# Patient Record
Sex: MALE | Race: WHITE | NOT HISPANIC OR LATINO | Employment: FULL TIME | URBAN - METROPOLITAN AREA
[De-identification: names, ages, dates, MRNs, and addresses within clinical notes are randomized per-mention and may not be internally consistent; named-entity substitution may affect disease eponyms.]

---

## 2018-09-18 ENCOUNTER — OFFICE VISIT (OUTPATIENT)
Dept: ORTHOPEDICS | Facility: CLINIC | Age: 33
End: 2018-09-18
Payer: COMMERCIAL

## 2018-09-18 VITALS — HEIGHT: 69 IN | WEIGHT: 174.16 LBS | BODY MASS INDEX: 25.8 KG/M2

## 2018-09-18 DIAGNOSIS — M54.2 CERVICALGIA: Primary | ICD-10-CM

## 2018-09-18 DIAGNOSIS — M54.9 UPPER BACK PAIN ON LEFT SIDE: ICD-10-CM

## 2018-09-18 NOTE — PROGRESS NOTES
"Sports Medicine Clinic Visit    PCP: No primary care provider on file.    Anthony Kingston is a 33 year old male who is seen  as self referral presenting with left shoulder pain/upper back pain.    Injury: None    Location of Pain: left shoulder  Duration of Pain: 2-3 week(s)  Pain is better with: Rest  Pain is worse with: Occasionally when motion (specifically with abduction and flexion)  Additional Features: Complains of loss of strength  Treatment so far consists of: Rest  Prior History of related problems:     Ht 5' 8.9\" (1.75 m)  Wt 174 lb 2.6 oz (79 kg)  BMI 25.8 kg/m2         PMH:  Condyloma acuminatum    Active problem list:  There is no problem list on file for this patient.      FH:  No family history on file.    SH:  Social History     Social History     Marital status:      Spouse name: N/A     Number of children: N/A     Years of education: N/A     Occupational History     Not on file.     Social History Main Topics     Smoking status: Light Tobacco Smoker     Smokeless tobacco: Never Used      Comment: E-Cigs on occasion     Alcohol use Not on file     Drug use: Not on file     Sexual activity: Not on file     Other Topics Concern     Not on file     Social History Narrative   physician in neuroradiology    MEDS:  See EMR, reviewed  ALL:  See EMR, reviewed    REVIEW OF SYSTEMS:  CONSTITUTIONAL:NEGATIVE for fever, chills, change in weight  INTEGUMENTARY/SKIN: NEGATIVE for worrisome rashes, moles or lesions  EYES: NEGATIVE for vision changes or irritation  ENT/MOUTH: NEGATIVE for ear, mouth and throat problems  RESP:NEGATIVE for significant cough or SOB  BREAST: NEGATIVE for masses, tenderness or discharge  CV: NEGATIVE for chest pain, palpitations or peripheral edema  GI: NEGATIVE for nausea, abdominal pain, heartburn, or change in bowel habits  :NEGATIVE for frequency, dysuria, or hematuria  :NEGATIVE for frequency, dysuria, or hematuria  NEURO: NEGATIVE for weakness, dizziness or " paresthesias  ENDOCRINE: NEGATIVE for temperature intolerance, skin/hair changes  HEME/ALLERGY/IMMUNE: NEGATIVE for bleeding problems  PSYCHIATRIC: NEGATIVE for changes in mood or affect        : 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  It seems that at the time he had clinical evidence of a winged scapula.  In the end it was related to a possible viral exposure.  Eventually it resolved.  In the midst of this workup he had an MRI of the brain, MRI of the neck and MRI of the brachial plexus that he was told were normal.  He also had an EMG but i s unsure of the actual results.   He is concerned that it could have recurred because in the last 2-3 weeks he has had a tendency to notice some discomfort in the left side of his upper back and with certain positions of lifting and reaching of his left arm he can feel discomfort in the area of the left shoulder blade or the area of the left axilla.  He denies any specific injury.  He is in the midst of a lot of studying so spends a lot of his time at a desk or computer.  He does spend some time in direct patient care.  He also has a young child at home.  This is all been stressful and he is uncertain whether some of it is related to stress.  He denies specific radicular discomfort into the upper extremity.  He says there are sometimes where he can feel a vague sensation of discomfort into the area of the deltoid upper arm forearm on the left but it does not seem to be persistent and there is no persistent numbness or tingling or burning.  He cannot think of any past injury to his neck.    Objective he tends towards a head forward shoulder forward posture.  He has normal range of motion to his cervical spine and a Spurling's maneuver is negative.  There are no impingement signs at  either shoulder and strength is intact bilaterally at the deltoid, supraspinatus, infraspinatus, subscapularis, biceps, triceps, forearm flexion, extension, digit he minimi, grasp.  Peripheral pulses are strong and symmetrical.  There is no scapular winging against a wall.  Repetitive upper extremity movements over the course of 2 minutes  in an abducted over the head position does not induce any signs of scapular winging due to muscle fatigue.  Coulter's test is negative.  Adson's maneuver is negative.  Rigoberto test is negative. Skin normal  A/ox 3.    Assessment: Neck and upper back discomfort ×2 weeks    Plan: He was reassured that I do not find signs of scapular winging on his exam.  We spent time talking about the importance of the ergonomics of the seated position and also the importance of a maintenance program that focuses on core strength and posterior shoulder strength and dynamics.  We talked about ways to achieve that over time such as yoga programs, core strengthening programs, fitness program such as [place] kettle bells.  He would like to see a physical therapist for a program that he can do at home for postural stabilization core strength and posterior shoulder dynamics and a referral was placed.

## 2018-09-18 NOTE — MR AVS SNAPSHOT
After Visit Summary   9/18/2018    Anthony Kingston    MRN: [id number]           Patient Information     Date Of Birth          1985        Visit Information        Provider Department      9/18/2018 12:30 PM Frank Arroyo MD [facility] Sports Medicine        Today's Diagnoses     Cervicalgia    -  1    Upper back pain on left side           Follow-ups after your visit        Additional Services     [facility] PT, HAND, AND CHIROPRACTIC REFERRAL       === This order will print in the [facility] Scheduling  Office ===    Physical therapy, hand therapy and chiropractic care are available through:    [facility]  [facility]    Call one easy number to schedule at any of the above locations:  [phone].    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  Young child at home.  Looking for program for home use to maximize posterior shoulder, scapular, postural cues, core strength  Therapy Orders:  Evaluate and Treat  Special Programs:  None  Special Request:  None    Additional Comments for the therapist or chiropractor:  H/o previously normal brain, neck, brachial plexus MRI in [place].  6-8 visits    Please be aware that coverage of these services is subject to the terms and limitations of your health insurance plan.  Call member services at your health plan with any benefit or coverage questions.      Please bring the following to your appointment:    >>   Your personal calendar for scheduling future appointments  >>   Comfortable clothing                  Who to contact     Please call your clinic at [phone] to:    Ask questions about your health    Make or cancel appointments    Discuss your medicines    Learn about your test  "results    Speak to your doctor            Additional Information About Your Visit        Power[employer] Information     [employer] gives you secure access to your electronic health record. If you see a primary care provider, you can also send messages to your care team and make appointments. If you have questions, please call your primary care clinic.  If you do not have a primary care provider, please call [phone] and they will assist you.      [employer] is an electronic gateway that provides easy, online access to your medical records. With [employer], you can request a clinic appointment, read your test results, renew a prescription or communicate with your care team.     To access your existing account, please contact your [facility] or call [phone] for assistance.        Care EveryWhere ID     This is your Care EveryWhere ID. This could be used by other organizations to access your [facility] medical records  [id number]        Your Vitals Were     Height BMI (Body Mass Index)                5' 8.9\" (1.75 m) 25.8 kg/m2           Blood Pressure from Last 3 Encounters:   No data found for BP    Weight from Last 3 Encounters:   09/18/18 174 lb 2.6 oz (79 kg)              We Performed the Following     [facility] PT, HAND, AND CHIROPRACTIC REFERRAL        Primary Care Provider    None Specified       No primary provider on file.        Equal Access to Services     MARCO ANTONIO FISCHER : Hadii brody jono Soeli, waaxda luqadaha, qaybta kaalmada adeegyada, darnell haro . So [facility] [phone].    ATENCIÓN: Si habla español, tiene a baez disposición servicios gratuitos de asistencia lingüística. Llame al [phone].    We comply with applicable federal civil rights laws and [place] laws. We do not discriminate on the basis of race, color, national origin, age, disability, sex, sexual orientation, or gender identity.            Thank you!     Thank you for choosing  [facility]  for your care. Our goal is always to provide you with excellent care. Hearing back from our patients is one way we can continue to improve our services. Please take a few minutes to complete the written survey that you may receive in the mail after your visit with us. Thank you!             Your Updated Medication List - Protect others around you: Learn how to safely use, store and throw away your medicines at www.disposemymeds.org.      Notice  As of 9/18/2018  1:56 PM    You have not been prescribed any medications.

## 2018-09-18 NOTE — LETTER
"  9/18/2018      RE: Anthony Kingston  [address]  [facility] [address]       Sports Medicine Clinic Visit    PCP: No primary care provider on file.    Anthony Kingston is a 33 year old male who is seen  as self referral presenting with left shoulder pain/upper back pain.    Injury: None    Location of Pain: left shoulder  Duration of Pain: 2-3 week(s)  Pain is better with: Rest  Pain is worse with: Occasionally when motion (specifically with abduction and flexion)  Additional Features: Complains of loss of strength  Treatment so far consists of: Rest  Prior History of related problems:     Ht 5' 8.9\" (1.75 m)  Wt 174 lb 2.6 oz (79 kg)  BMI 25.8 kg/m2         PMH:  Condyloma acuminatum    Active problem list:  There is no problem list on file for this patient.      FH:  No family history on file.    SH:  Social History     Social History     Marital status:      Spouse name: N/A     Number of children: N/A     Years of education: N/A     Occupational History     Not on file.     Social History Main Topics     Smoking status: Light Tobacco Smoker     Smokeless tobacco: Never Used      Comment: E-Cigs on occasion     Alcohol use Not on file     Drug use: Not on file     Sexual activity: Not on file     Other Topics Concern     Not on file     Social History Narrative   physician in neuroradiology    MEDS:  See EMR, reviewed  ALL:  See EMR, reviewed    REVIEW OF SYSTEMS:  CONSTITUTIONAL:NEGATIVE for fever, chills, change in weight  INTEGUMENTARY/SKIN: NEGATIVE for worrisome rashes, moles or lesions  EYES: NEGATIVE for vision changes or irritation  ENT/MOUTH: NEGATIVE for ear, mouth and throat problems  RESP:NEGATIVE for significant cough or SOB  BREAST: NEGATIVE for masses, tenderness or discharge  CV: NEGATIVE for chest pain, palpitations or peripheral edema  GI: NEGATIVE for nausea, abdominal pain, heartburn, or change in bowel habits  :NEGATIVE for frequency, dysuria, or hematuria  :NEGATIVE for " frequency, dysuria, or hematuria  NEURO: NEGATIVE for weakness, dizziness or paresthesias  ENDOCRINE: NEGATIVE for temperature intolerance, skin/hair changes  HEME/ALLERGY/IMMUNE: NEGATIVE for bleeding problems  PSYCHIATRIC: NEGATIVE for changes in mood or affect        : 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  It seems that at the time he had clinical evidence of a winged scapula.  In the end it was related to a possible viral exposure.  Eventually it resolved.  In the midst of this workup he had an MRI of the brain, MRI of the neck and MRI of the brachial plexus that he was told were normal.  He also had an EMG but i s unsure of the actual results.   He is concerned that it could have recurred because in the last 2-3 weeks he has had a tendency to notice some discomfort in the left side of his upper back and with certain positions of lifting and reaching of his left arm he can feel discomfort in the area of the left shoulder blade or the area of the left axilla.  He denies any specific injury.  He is in the midst of a lot of studying so spends a lot of his time at a desk or computer.  He does spend some time in direct patient care.  He also has a young child at home.  This is all been stressful and he is uncertain whether some of it is related to stress.  He denies specific radicular discomfort into the upper extremity.  He says there are sometimes where he can feel a vague sensation of discomfort into the area of the deltoid upper arm forearm on the left but it does not seem to be persistent and there is no persistent numbness or tingling or burning.  He cannot think of any past injury to his neck.    Objective he tends towards a head forward shoulder forward posture.  He has normal range of motion to his cervical spine  and a Spurling's maneuver is negative.  There are no impingement signs at either shoulder and strength is intact bilaterally at the deltoid, supraspinatus, infraspinatus, subscapularis, biceps, triceps, forearm flexion, extension, digit he minimi, grasp.  Peripheral pulses are strong and symmetrical.  There is no scapular winging against a wall.  Repetitive upper extremity movements over the course of 2 minutes  in an abducted over the head position does not induce any signs of scapular winging due to muscle fatigue.  [place]'s test is negative.  Adson's maneuver is negative.  Rigoberto test is negative. Skin normal  A/ox 3.    Assessment: Neck and upper back discomfort ×2 weeks    Plan: He was reassured that I do not find signs of scapular winging on his exam.  We spent time talking about the importance of the ergonomics of the seated position and also the importance of a maintenance program that focuses on core strength and posterior shoulder strength and dynamics.  We talked about ways to achieve that over time such as yoga programs, core strengthening programs, fitness program such as [employer] bells.  He would like to see a physical therapist for a program that he can do at home for postural stabilization core strength and posterior shoulder dynamics and a referral was placed.        Frank Arroyo MD

## 2018-10-12 ENCOUNTER — ALLIED HEALTH/NURSE VISIT (OUTPATIENT)
Dept: NURSING | Facility: CLINIC | Age: 33
End: 2018-10-12
Payer: COMMERCIAL

## 2018-10-12 DIAGNOSIS — Z23 NEED FOR PROPHYLACTIC VACCINATION AND INOCULATION AGAINST INFLUENZA: Primary | ICD-10-CM

## 2018-10-12 PROCEDURE — 99207 ZZC NO CHARGE NURSE ONLY: CPT

## 2018-10-12 PROCEDURE — 90686 IIV4 VACC NO PRSV 0.5 ML IM: CPT

## 2018-10-12 PROCEDURE — 90471 IMMUNIZATION ADMIN: CPT

## 2018-10-12 NOTE — PROGRESS NOTES

## 2018-10-12 NOTE — MR AVS SNAPSHOT
After Visit Summary   10/12/2018    Anthony Kingston    MRN: [id number]           Patient Information     Date Of Birth          1985        Visit Information        Provider Department      10/12/2018 3:45 PM CARE COORDINATOR [facility]        Today's Diagnoses     Need for prophylactic vaccination and inoculation against influenza    -  1       Follow-ups after your visit        Who to contact     If you have questions or need follow up information about today's clinic visit or your schedule please contact [facility] directly at [phone].  Normal or non-critical lab and imaging results will be communicated to you by Scan[employer]hart, letter or phone within 4 business days after the clinic has received the results. If you do not hear from us within 7 days, please contact the clinic through Boost[employer] or phone. If you have a critical or abnormal lab result, we will notify you by phone as soon as possible.  Submit refill requests through [employer] or call your pharmacy and they will forward the refill request to us. Please allow 3 business days for your refill to be completed.          Additional Information About Your Visit        MyChart Information     [employer] gives you secure access to your electronic health record. If you see a primary care provider, you can also send messages to your care team and make appointments. If you have questions, please call your primary care clinic.  If you do not have a primary care provider, please call [phone] and they will assist you.        Care EveryWhere ID     This is your Care EveryWhere ID. This could be used by other organizations to access your [facility] medical records  [id number]         Blood Pressure from Last 3 Encounters:   No data found for BP    Weight from Last 3 Encounters:   09/18/18 174 lb 2.6 oz (79 kg)              We Performed the Following     FLU VACCINE, SPLIT VIRUS, IM (QUADRIVALENT)  [78376]- >3 YRS     Vaccine Administration, Initial [69394]        Primary Care Provider    None Specified       No primary provider on file.        Equal Access to Services     MARCO ANTONIO FISCHER : Hadii brody Black, wazeinabasaf bolivar, karenkyle snidertingasaf montesporter, darnell orinin hayaan simontheresa corrales lamakhome bharat. So [facility] [phone].    ATENCIÓN: Si habla español, tiene a baez disposición servicios gratuitos de asistencia lingüística. Llame al [phone].    We comply with applicable federal civil rights laws and [place] laws. We do not discriminate on the basis of race, color, national origin, age, disability, sex, sexual orientation, or gender identity.            Thank you!     Thank you for choosing [facility]  for your care. Our goal is always to provide you with excellent care. Hearing back from our patients is one way we can continue to improve our services. Please take a few minutes to complete the written survey that you may receive in the mail after your visit with us. Thank you!             Your Updated Medication List - Protect others around you: Learn how to safely use, store and throw away your medicines at www.disposemymeds.org.      Notice  As of 10/12/2018  4:38 PM    You have not been prescribed any medications.

## 2020-03-10 ENCOUNTER — HEALTH MAINTENANCE LETTER (OUTPATIENT)
Age: 35
End: 2020-03-10

## 2020-03-18 DIAGNOSIS — R63.5 WEIGHT GAIN: Primary | ICD-10-CM

## 2020-03-19 DIAGNOSIS — R63.5 WEIGHT GAIN: ICD-10-CM

## 2020-03-19 LAB
ALBUMIN SERPL-MCNC: 4.5 G/DL (ref 3.4–5)
ALP SERPL-CCNC: 67 U/L (ref 40–150)
ALT SERPL W P-5'-P-CCNC: 21 U/L (ref 0–70)
ANION GAP SERPL CALCULATED.3IONS-SCNC: 4 MMOL/L (ref 3–14)
AST SERPL W P-5'-P-CCNC: 13 U/L (ref 0–45)
BASOPHILS # BLD AUTO: 0 10E9/L (ref 0–0.2)
BASOPHILS NFR BLD AUTO: 0.6 %
BILIRUB SERPL-MCNC: 0.6 MG/DL (ref 0.2–1.3)
BUN SERPL-MCNC: 9 MG/DL (ref 7–30)
CALCIUM SERPL-MCNC: 9.6 MG/DL (ref 8.5–10.1)
CHLORIDE SERPL-SCNC: 107 MMOL/L (ref 94–109)
CHOLEST SERPL-MCNC: 209 MG/DL
CO2 SERPL-SCNC: 30 MMOL/L (ref 20–32)
CREAT SERPL-MCNC: 0.88 MG/DL (ref 0.66–1.25)
DIFFERENTIAL METHOD BLD: NORMAL
EOSINOPHIL # BLD AUTO: 0.1 10E9/L (ref 0–0.7)
EOSINOPHIL NFR BLD AUTO: 2.5 %
ERYTHROCYTE [DISTWIDTH] IN BLOOD BY AUTOMATED COUNT: 12.3 % (ref 10–15)
GFR SERPL CREATININE-BSD FRML MDRD: >90 ML/MIN/{1.73_M2}
GLUCOSE SERPL-MCNC: 85 MG/DL (ref 70–99)
HCT VFR BLD AUTO: 48.9 % (ref 40–53)
HDLC SERPL-MCNC: 41 MG/DL
HGB BLD-MCNC: 16.5 G/DL (ref 13.3–17.7)
IMM GRANULOCYTES # BLD: 0 10E9/L (ref 0–0.4)
IMM GRANULOCYTES NFR BLD: 0.2 %
LDLC SERPL CALC-MCNC: 129 MG/DL
LYMPHOCYTES # BLD AUTO: 2.7 10E9/L (ref 0.8–5.3)
LYMPHOCYTES NFR BLD AUTO: 51.9 %
MCH RBC QN AUTO: 29.6 PG (ref 26.5–33)
MCHC RBC AUTO-ENTMCNC: 33.7 G/DL (ref 31.5–36.5)
MCV RBC AUTO: 88 FL (ref 78–100)
MONOCYTES # BLD AUTO: 0.5 10E9/L (ref 0–1.3)
MONOCYTES NFR BLD AUTO: 8.7 %
NEUTROPHILS # BLD AUTO: 1.9 10E9/L (ref 1.6–8.3)
NEUTROPHILS NFR BLD AUTO: 36.1 %
NONHDLC SERPL-MCNC: 167 MG/DL
NRBC # BLD AUTO: 0 10*3/UL
NRBC BLD AUTO-RTO: 0 /100
PLATELET # BLD AUTO: 260 10E9/L (ref 150–450)
POTASSIUM SERPL-SCNC: 4.3 MMOL/L (ref 3.4–5.3)
PROT SERPL-MCNC: 8.1 G/DL (ref 6.8–8.8)
RBC # BLD AUTO: 5.58 10E12/L (ref 4.4–5.9)
SODIUM SERPL-SCNC: 141 MMOL/L (ref 133–144)
TRIGL SERPL-MCNC: 191 MG/DL
TSH SERPL DL<=0.005 MIU/L-ACNC: 2.52 MU/L (ref 0.4–4)
WBC # BLD AUTO: 5.2 10E9/L (ref 4–11)

## 2020-09-15 ENCOUNTER — OFFICE VISIT (OUTPATIENT)
Dept: ORTHOPEDICS | Facility: CLINIC | Age: 35
End: 2020-09-15

## 2020-09-15 VITALS — WEIGHT: 189.7 LBS | RESPIRATION RATE: 16 BRPM | BODY MASS INDEX: 27.16 KG/M2 | HEIGHT: 70 IN

## 2020-09-15 DIAGNOSIS — M51.26 HERNIATED LUMBAR INTERVERTEBRAL DISC: Primary | ICD-10-CM

## 2020-09-15 RX ORDER — HYDROCODONE BITARTRATE AND ACETAMINOPHEN 5; 325 MG/1; MG/1
1 TABLET ORAL EVERY 6 HOURS PRN
COMMUNITY
End: 2022-01-04

## 2020-09-15 RX ORDER — METHYLPREDNISOLONE 4 MG
TABLET, DOSE PACK ORAL
Qty: 21 TABLET | Refills: 0 | Status: SHIPPED | OUTPATIENT
Start: 2020-09-15 | End: 2023-08-11

## 2020-09-15 ASSESSMENT — MIFFLIN-ST. JEOR: SCORE: 1804.21

## 2020-09-15 NOTE — LETTER
9/15/2020         RE: Anthony Kingston  [address]  [place] [address]        Dear Colleague,    Thank you for referring your patient, Anthony Kingston, to the [facility]. Please see a copy of my visit note below.    HISTORY OF PRESENT ILLNESS  Mr. Kingston is a pleasant 35 year old year old male who presents to clinic today with low back pain  Can explains that his low back has spasms over the past few weeks  Has pain and tingling in leg at times  Location: low back  Quality:  achy pain    Severity: 5/10 at worst    Duration: 1 month  Timing: occurs intermittently  Context: occurs while exercising and lifting  Modifying factors:  resting and non-use makes it better, movement and use makes it worse  Associated signs & symptoms: radiation of pain into legs  MEDICAL HISTORY  There is no problem list on file for this patient.      Current Outpatient Medications   Medication Sig Dispense Refill     HYDROcodone-acetaminophen (NORCO) 5-325 MG tablet Take 1 tablet by mouth every 6 hours as needed for severe pain         Allergies   Allergen Reactions     No Clinical Screening - See Comments      States he has contact dermatitis        No family history on file.  Social History     Socioeconomic History     Marital status:      Spouse name: None     Number of children: None     Years of education: None     Highest education level: None   Occupational History     None   Social Needs     Financial resource strain: None     Food insecurity     Worry: None     Inability: None     Transportation needs     Medical: None     Non-medical: None   Tobacco Use     Smoking status: Light Tobacco Smoker     Smokeless tobacco: Never Used     Tobacco comment: E-Cigs on occasion   Substance and Sexual Activity     Alcohol use: None     Drug use: None     Sexual activity: None   Lifestyle     Physical activity     Days per week: None     Minutes per session: None     Stress: None   Relationships      "Social connections     Talks on phone: None     Gets together: None     Attends Sikhism service: None     Active member of club or organization: None     Attends meetings of clubs or organizations: None     Relationship status: None     Intimate partner violence     Fear of current or ex partner: None     Emotionally abused: None     Physically abused: None     Forced sexual activity: None   Other Topics Concern     Parent/sibling w/ CABG, MI or angioplasty before 65F 55M? Not Asked   Social History Narrative     None       Additional medical/Social/Surgical histories reviewed in EPIC and updated as appropriate.     REVIEW OF SYSTEMS (9/15/2020)  10 point ROS of systems including Constitutional, Eyes, Respiratory, Cardiovascular, Gastroenterology, Genitourinary, Integumentary, Musculoskeletal, Psychiatric, Allergic/Immunologic were all negative except for pertinent positives noted in my HPI.     PHYSICAL EXAM  Vitals:    09/15/20 1402   Resp: 16   Weight: 86 kg (189 lb 11.2 oz)   Height: 1.79 m (5' 10.47\")     Vital Signs: Resp 16   Ht 1.79 m (5' 10.47\")   Wt 86 kg (189 lb 11.2 oz)   BMI 26.86 kg/m   Patient declined being weighed. Body mass index is 26.86 kg/m .    General  - normal appearance, in no obvious distress  HEENT  - conjunctivae not injected, moist mucous membranes, normocephalic/atraumatic head, ears normal appearance, no lesions, mouth normal appearance, no scars, normal dentition and teeth present  CV  - normal peripheral perfusion  Pulm  - normal respiratory pattern, non-labored  Musculoskeletal - lumbar spine  - stance: normal gait without limp, no obvious leg length discrepancy, normal heel and toe walk  - inspection: normal bone and joint alignment, no obvious scoliosis  - palpation: no paravertebral or bony tenderness  - ROM: flexion exacerbates pain, normal extension, sidebending, rotation  - strength: lower extremities 5/5 in all planes  - special tests:  (-) straight leg raise  (+) slump " test  Neuro  - patellar and Achilles DTRs 2+ bilaterally, NO lower extremity sensory deficit throughout L5 distribution, grossly normal coordination, normal muscle tone  Skin  - no ecchymosis, erythema, warmth, or induration, no obvious rash  Psych  - interactive, appropriate, normal mood and affect  ASSESSMENT & PLAN  36 yo male with lumbar disc herniation, radicular pain, stable  Reviewed lumbar imaging: shows ddd  Medrol and tizanadine  Given HEP  Consider PT  F/u in 3 weeks      Juan Bautista MD, CAQSM

## 2020-09-15 NOTE — PROGRESS NOTES
HISTORY OF PRESENT ILLNESS  Mr. Kingston is a pleasant 35 year old year old male who presents to clinic today with low back pain  Can explains that his low back has spasms over the past few weeks  Has pain and tingling in leg at times  Location: low back  Quality:  achy pain    Severity: 5/10 at worst    Duration: 1 month  Timing: occurs intermittently  Context: occurs while exercising and lifting  Modifying factors:  resting and non-use makes it better, movement and use makes it worse  Associated signs & symptoms: radiation of pain into legs  MEDICAL HISTORY  There is no problem list on file for this patient.      Current Outpatient Medications   Medication Sig Dispense Refill     HYDROcodone-acetaminophen (NORCO) 5-325 MG tablet Take 1 tablet by mouth every 6 hours as needed for severe pain         Allergies   Allergen Reactions     No Clinical Screening - See Comments      States he has contact dermatitis        No family history on file.  Social History     Socioeconomic History     Marital status:      Spouse name: None     Number of children: None     Years of education: None     Highest education level: None   Occupational History     None   Social Needs     Financial resource strain: None     Food insecurity     Worry: None     Inability: None     Transportation needs     Medical: None     Non-medical: None   Tobacco Use     Smoking status: Light Tobacco Smoker     Smokeless tobacco: Never Used     Tobacco comment: E-Cigs on occasion   Substance and Sexual Activity     Alcohol use: None     Drug use: None     Sexual activity: None   Lifestyle     Physical activity     Days per week: None     Minutes per session: None     Stress: None   Relationships     Social connections     Talks on phone: None     Gets together: None     Attends Alevism service: None     Active member of club or organization: None     Attends meetings of clubs or organizations: None     Relationship status: None     Intimate  "partner violence     Fear of current or ex partner: None     Emotionally abused: None     Physically abused: None     Forced sexual activity: None   Other Topics Concern     Parent/sibling w/ CABG, MI or angioplasty before 65F 55M? Not Asked   Social History Narrative     None       Additional medical/Social/Surgical histories reviewed in [facility] and updated as appropriate.     REVIEW OF SYSTEMS (9/15/2020)  10 point ROS of systems including Constitutional, Eyes, Respiratory, Cardiovascular, Gastroenterology, Genitourinary, Integumentary, Musculoskeletal, Psychiatric, Allergic/Immunologic were all negative except for pertinent positives noted in my HPI.     PHYSICAL EXAM  Vitals:    09/15/20 1402   Resp: 16   Weight: 86 kg (189 lb 11.2 oz)   Height: 1.79 m (5' 10.47\")     Vital Signs: Resp 16   Ht 1.79 m (5' 10.47\")   Wt 86 kg (189 lb 11.2 oz)   BMI 26.86 kg/m   Patient declined being weighed. Body mass index is 26.86 kg/m .    General  - normal appearance, in no obvious distress  HEENT  - conjunctivae not injected, moist mucous membranes, normocephalic/atraumatic head, ears normal appearance, no lesions, mouth normal appearance, no scars, normal dentition and teeth present  CV  - normal peripheral perfusion  Pulm  - normal respiratory pattern, non-labored  Musculoskeletal - lumbar spine  - stance: normal gait without limp, no obvious leg length discrepancy, normal heel and toe walk  - inspection: normal bone and joint alignment, no obvious scoliosis  - palpation: no paravertebral or bony tenderness  - ROM: flexion exacerbates pain, normal extension, sidebending, rotation  - strength: lower extremities 5/5 in all planes  - special tests:  (-) straight leg raise  (+) slump test  Neuro  - patellar and Achilles DTRs 2+ bilaterally, NO lower extremity sensory deficit throughout L5 distribution, grossly normal coordination, normal muscle tone  Skin  - no ecchymosis, erythema, warmth, or induration, no obvious " rash  Psych  - interactive, appropriate, normal mood and affect  ASSESSMENT & PLAN  34 yo male with lumbar disc herniation, radicular pain, stable  Reviewed lumbar imaging: shows ddd  Medrol and tizanadine  Given HEP  Consider PT  F/u in 3 weeks      Juan Bautista MD, CAQSM

## 2020-09-15 NOTE — NURSING NOTE
"Reason For Visit:   Chief Complaint   Patient presents with     RECHECK     MRI Results      Resp 16   Ht 1.79 m (5' 10.47\")   Wt 86 kg (189 lb 11.2 oz)   BMI 26.86 kg/m      Pain Assessment  Patient Currently in Pain: Yes  0-10 Pain Scale: 7    Johanny Monsivais ATC     "

## 2020-12-27 ENCOUNTER — HEALTH MAINTENANCE LETTER (OUTPATIENT)
Age: 35
End: 2020-12-27

## 2021-04-24 ENCOUNTER — HEALTH MAINTENANCE LETTER (OUTPATIENT)
Age: 36
End: 2021-04-24

## 2021-10-09 ENCOUNTER — HEALTH MAINTENANCE LETTER (OUTPATIENT)
Age: 36
End: 2021-10-09

## 2021-12-10 ENCOUNTER — ANCILLARY PROCEDURE (OUTPATIENT)
Dept: GENERAL RADIOLOGY | Facility: CLINIC | Age: 36
End: 2021-12-10
Attending: PREVENTIVE MEDICINE
Payer: COMMERCIAL

## 2021-12-10 ENCOUNTER — OFFICE VISIT (OUTPATIENT)
Dept: ORTHOPEDICS | Facility: CLINIC | Age: 36
End: 2021-12-10
Payer: COMMERCIAL

## 2021-12-10 VITALS — BODY MASS INDEX: 26.94 KG/M2 | HEIGHT: 69 IN | WEIGHT: 181.88 LBS

## 2021-12-10 DIAGNOSIS — M25.562 CHRONIC PAIN OF LEFT KNEE: Primary | ICD-10-CM

## 2021-12-10 DIAGNOSIS — Z00.00 HEALTH MAINTENANCE EXAMINATION: ICD-10-CM

## 2021-12-10 DIAGNOSIS — Z13.1 SCREENING FOR DIABETES MELLITUS: ICD-10-CM

## 2021-12-10 DIAGNOSIS — Z13.220 LIPID SCREENING: ICD-10-CM

## 2021-12-10 DIAGNOSIS — G89.29 CHRONIC PAIN OF LEFT KNEE: Primary | ICD-10-CM

## 2021-12-10 DIAGNOSIS — M25.562 KNEE PAIN, LEFT: ICD-10-CM

## 2021-12-10 PROCEDURE — 99213 OFFICE O/P EST LOW 20 MIN: CPT | Performed by: PREVENTIVE MEDICINE

## 2021-12-10 PROCEDURE — 73560 X-RAY EXAM OF KNEE 1 OR 2: CPT | Mod: RT | Performed by: RADIOLOGY

## 2021-12-10 PROCEDURE — 72100 X-RAY EXAM L-S SPINE 2/3 VWS: CPT | Performed by: RADIOLOGY

## 2021-12-10 RX ORDER — DICLOFENAC SODIUM 75 MG/1
75 TABLET, DELAYED RELEASE ORAL 2 TIMES DAILY
Qty: 60 TABLET | Refills: 0 | Status: SHIPPED | OUTPATIENT
Start: 2021-12-10 | End: 2023-08-11

## 2021-12-10 ASSESSMENT — MIFFLIN-ST. JEOR: SCORE: 1738.76

## 2021-12-10 NOTE — PROGRESS NOTES
HISTORY OF PRESENT ILLNESS  Mr. Kingston is a pleasant 36 year old year old male who presents to clinic today with left knee pain and radicular pain and low back pain  And request for health maintenance labs  Can explains that he doesn't have a PCP  Wants to get labs for health maintenance    Location: left knee , low back  Quality:  achy pain    Severity: 7/10 at worst    Duration: past year  Timing: occurs intermittently  Context: occurs while exercising and lifting  Modifying factors:  resting and non-use makes it better, movement and use makes it worse  Associated signs & symptoms: not swelling in knee, some low back pain that travels down leg    MEDICAL HISTORY  There is no problem list on file for this patient.      Current Outpatient Medications   Medication Sig Dispense Refill     HYDROcodone-acetaminophen (NORCO) 5-325 MG tablet Take 1 tablet by mouth every 6 hours as needed for severe pain       methylPREDNISolone (MEDROL DOSEPAK) 4 MG tablet therapy pack Follow Package Directions 21 tablet 0     methylPREDNISolone (MEDROL DOSEPAK) 4 MG tablet therapy pack Follow Package Directions 21 tablet 0     tiZANidine (ZANAFLEX) 4 MG tablet Take 1-2 tablets (4-8 mg) by mouth nightly as needed 30 tablet 1     tiZANidine (ZANAFLEX) 4 MG tablet Take 1-2 tablets (4-8 mg) by mouth nightly as needed 30 tablet 1       Allergies   Allergen Reactions     Unknown [No Clinical Screening - See Comments]      States he has contact dermatitis        No family history on file.  Social History     Socioeconomic History     Marital status:      Spouse name: Not on file     Number of children: Not on file     Years of education: Not on file     Highest education level: Not on file   Occupational History     Not on file   Tobacco Use     Smoking status: Light Tobacco Smoker     Smokeless tobacco: Never Used     Tobacco comment: E-Cigs on occasion   Substance and Sexual Activity     Alcohol use: Not on file     Drug use: Not on  file     Sexual activity: Not on file   Other Topics Concern     Parent/sibling w/ CABG, MI or angioplasty before 65F 55M? Not Asked   Social History Narrative     Not on file     Social Determinants of Health     Financial Resource Strain: Not on file   Food Insecurity: Not on file   Transportation Needs: Not on file   Physical Activity: Not on file   Stress: Not on file   Social Connections: Not on file   Intimate Partner Violence: Not on file   Housing Stability: Not on file       Additional medical/Social/Surgical histories reviewed in [facility] and updated as appropriate.     REVIEW OF SYSTEMS (12/10/2021)  10 point ROS of systems including Constitutional, Eyes, Respiratory, Cardiovascular, Gastroenterology, Genitourinary, Integumentary, Musculoskeletal, Psychiatric, Allergic/Immunologic were all negative except for pertinent positives noted in my HPI.     PHYSICAL EXAM  VSS  General  - normal appearance, in no obvious distress  HEENT  - conjunctivae not injected, moist mucous membranes, normocephalic/atraumatic head, ears normal appearance, no lesions, mouth normal appearance, no scars, normal dentition and teeth present  CV  - normal popliteal pulse  Pulm  - normal respiratory pattern, non-labored  Musculoskeletal - left knee  - stance: non antalgic gait, genu varum  - inspection: no generalized swelling, no trace effusion  - palpation: medial joint line tenderness, patella and patellar tendon non-tender, normal popliteal pulse  - ROM: 100 degrees flexion, 0 degrees extension, NON painful active ROM  - strength: 5/5 in flexion, 5/5 in extension  - neuro: no sensory or motor deficit  - special tests:  (-) Lachman  (-) anterior drawer  (-) posterior drawer  (-) pivot shift  (-) Amarilis  (-) Thessaly  (-) varus at 0 and 30 degrees flexion  (-) valgus at 0 and 30 degrees flexion  (-) Carmine s compression test  (-) patellar apprehension  Neuro  - no sensory or motor deficit, grossly normal coordination, normal muscle  tone  Skin  - no ecchymosis, erythema, warmth, or induration, no obvious rash  Psych  - interactive, appropriate, normal mood and affect  Low back: has mild pain with extension, no SLR  Cardiac: normal RRR, no murmurs, lungs: CTAB, no wheezing  ASSESSMENT & PLAN  37 yo male with left knee arthritis, lumbar radicular pain, samantha maintenance exam    I independently reviewed the following imaging studies:  xrays lumbar; Has some narrowing in lumbar spine joints  xrays knee: shows some arthritis  Consider injection for knee, consider MRI  Given HEP  RX for voltaren  Ordered labs   F/u in 1-2 months    Appropriate PPE was utilized for prevention of spread of Covid-19.  Juan Bautista MD, CAQSM

## 2021-12-10 NOTE — LETTER
12/10/2021      RE: Anthony Kingston  [address]  [place] [address]       HISTORY OF PRESENT ILLNESS  Mr. Kingston is a pleasant 36 year old year old male who presents to clinic today with left knee pain and radicular pain and low back pain  And request for health maintenance labs  Can explains that he doesn't have a PCP  Wants to get labs for health maintenance    Location: left knee , low back  Quality:  achy pain    Severity: 7/10 at worst    Duration: past year  Timing: occurs intermittently  Context: occurs while exercising and lifting  Modifying factors:  resting and non-use makes it better, movement and use makes it worse  Associated signs & symptoms: not swelling in knee, some low back pain that travels down leg    MEDICAL HISTORY  There is no problem list on file for this patient.      Current Outpatient Medications   Medication Sig Dispense Refill     HYDROcodone-acetaminophen (NORCO) 5-325 MG tablet Take 1 tablet by mouth every 6 hours as needed for severe pain       methylPREDNISolone (MEDROL DOSEPAK) 4 MG tablet therapy pack Follow Package Directions 21 tablet 0     methylPREDNISolone (MEDROL DOSEPAK) 4 MG tablet therapy pack Follow Package Directions 21 tablet 0     tiZANidine (ZANAFLEX) 4 MG tablet Take 1-2 tablets (4-8 mg) by mouth nightly as needed 30 tablet 1     tiZANidine (ZANAFLEX) 4 MG tablet Take 1-2 tablets (4-8 mg) by mouth nightly as needed 30 tablet 1       Allergies   Allergen Reactions     Unknown [No Clinical Screening - See Comments]      States he has contact dermatitis        No family history on file.  Social History     Socioeconomic History     Marital status:      Spouse name: Not on file     Number of children: Not on file     Years of education: Not on file     Highest education level: Not on file   Occupational History     Not on file   Tobacco Use     Smoking status: Light Tobacco Smoker     Smokeless tobacco: Never Used     Tobacco comment: E-Cigs on  occasion   Substance and Sexual Activity     Alcohol use: Not on file     Drug use: Not on file     Sexual activity: Not on file   Other Topics Concern     Parent/sibling w/ CABG, MI or angioplasty before 65F 55M? Not Asked   Social History Narrative     Not on file     Social Determinants of Health     Financial Resource Strain: Not on file   Food Insecurity: Not on file   Transportation Needs: Not on file   Physical Activity: Not on file   Stress: Not on file   Social Connections: Not on file   Intimate Partner Violence: Not on file   Housing Stability: Not on file       Additional medical/Social/Surgical histories reviewed in [facility] and updated as appropriate.     REVIEW OF SYSTEMS (12/10/2021)  10 point ROS of systems including Constitutional, Eyes, Respiratory, Cardiovascular, Gastroenterology, Genitourinary, Integumentary, Musculoskeletal, Psychiatric, Allergic/Immunologic were all negative except for pertinent positives noted in my HPI.     PHYSICAL EXAM  VSS  General  - normal appearance, in no obvious distress  HEENT  - conjunctivae not injected, moist mucous membranes, normocephalic/atraumatic head, ears normal appearance, no lesions, mouth normal appearance, no scars, normal dentition and teeth present  CV  - normal popliteal pulse  Pulm  - normal respiratory pattern, non-labored  Musculoskeletal - left knee  - stance: non antalgic gait, genu varum  - inspection: no generalized swelling, no trace effusion  - palpation: medial joint line tenderness, patella and patellar tendon non-tender, normal popliteal pulse  - ROM: 100 degrees flexion, 0 degrees extension, NON painful active ROM  - strength: 5/5 in flexion, 5/5 in extension  - neuro: no sensory or motor deficit  - special tests:  (-) Lachman  (-) anterior drawer  (-) posterior drawer  (-) pivot shift  (-) Amarilis  (-) Thessaly  (-) varus at 0 and 30 degrees flexion  (-) valgus at 0 and 30 degrees flexion  (-) Carmine s compression test  (-) patellar  apprehension  Neuro  - no sensory or motor deficit, grossly normal coordination, normal muscle tone  Skin  - no ecchymosis, erythema, warmth, or induration, no obvious rash  Psych  - interactive, appropriate, normal mood and affect  Low back: has mild pain with extension, no SLR  Cardiac: normal RRR, no murmurs, lungs: CTAB, no wheezing  ASSESSMENT & PLAN  37 yo male with left knee arthritis, lumbar radicular pain, samantha maintenance exam    I independently reviewed the following imaging studies:  xrays lumbar; Has some narrowing in lumbar spine joints  xrays knee: shows some arthritis  Consider injection for knee, consider MRI  Given HEP  RX for voltaren  Ordered labs   F/u in 1-2 months    Appropriate PPE was utilized for prevention of spread of Covid-19.  Juan Bautista MD, CAM

## 2021-12-10 NOTE — NURSING NOTE
"Reason For Visit:   Chief Complaint   Patient presents with     Consult     Right Knee Pain for 6-7 months aggrivated with excercise causes patient to stop his exercises. Pain with squats and flexion in posterior medial spectrum of the knee. Painful with standing as well.        Ht 1.75 m (5' 8.9\")   Wt 82.5 kg (181 lb 14.1 oz)   BMI 26.94 kg/m      Pain Assessment  Patient Currently in Pain: Yes  0-10 Pain Scale: 2 (patient says at rest it is a 1 but with exercise is a 10,reports to have been doing mountain climbers and had to stop, says it was hot and swollen after exercise)  Primary Pain Location: Knee  Pain Descriptors: Burning,Dull  Alleviating Factors: Rest,Stretching    Dimitri Iqbal, EMT    "

## 2021-12-14 ENCOUNTER — LAB (OUTPATIENT)
Dept: LAB | Facility: CLINIC | Age: 36
End: 2021-12-14
Payer: COMMERCIAL

## 2021-12-14 DIAGNOSIS — Z13.1 SCREENING FOR DIABETES MELLITUS: ICD-10-CM

## 2021-12-14 DIAGNOSIS — Z13.220 LIPID SCREENING: ICD-10-CM

## 2021-12-14 DIAGNOSIS — Z00.00 HEALTH MAINTENANCE EXAMINATION: ICD-10-CM

## 2021-12-14 LAB
ALBUMIN SERPL-MCNC: 4.1 G/DL (ref 3.4–5)
ALP SERPL-CCNC: 61 U/L (ref 40–150)
ALT SERPL W P-5'-P-CCNC: 19 U/L (ref 0–70)
ANION GAP SERPL CALCULATED.3IONS-SCNC: 8 MMOL/L (ref 3–14)
AST SERPL W P-5'-P-CCNC: 13 U/L (ref 0–45)
BASOPHILS # BLD AUTO: 0 10E3/UL (ref 0–0.2)
BASOPHILS NFR BLD AUTO: 0 %
BILIRUB SERPL-MCNC: 0.7 MG/DL (ref 0.2–1.3)
BUN SERPL-MCNC: 11 MG/DL (ref 7–30)
CALCIUM SERPL-MCNC: 9.6 MG/DL (ref 8.5–10.1)
CHLORIDE BLD-SCNC: 109 MMOL/L (ref 94–109)
CO2 SERPL-SCNC: 26 MMOL/L (ref 20–32)
CREAT SERPL-MCNC: 0.85 MG/DL (ref 0.66–1.25)
EOSINOPHIL # BLD AUTO: 0.2 10E3/UL (ref 0–0.7)
EOSINOPHIL NFR BLD AUTO: 3 %
ERYTHROCYTE [DISTWIDTH] IN BLOOD BY AUTOMATED COUNT: 12.5 % (ref 10–15)
GFR SERPL CREATININE-BSD FRML MDRD: >90 ML/MIN/1.73M2
GLUCOSE BLD-MCNC: 97 MG/DL (ref 70–99)
HBA1C MFR BLD: 4.8 % (ref 0–5.6)
HCT VFR BLD AUTO: 48.6 % (ref 40–53)
HGB BLD-MCNC: 16.1 G/DL (ref 13.3–17.7)
IMM GRANULOCYTES # BLD: 0 10E3/UL
IMM GRANULOCYTES NFR BLD: 0 %
LYMPHOCYTES # BLD AUTO: 1.8 10E3/UL (ref 0.8–5.3)
LYMPHOCYTES NFR BLD AUTO: 24 %
MCH RBC QN AUTO: 29.4 PG (ref 26.5–33)
MCHC RBC AUTO-ENTMCNC: 33.1 G/DL (ref 31.5–36.5)
MCV RBC AUTO: 89 FL (ref 78–100)
MONOCYTES # BLD AUTO: 0.5 10E3/UL (ref 0–1.3)
MONOCYTES NFR BLD AUTO: 7 %
NEUTROPHILS # BLD AUTO: 4.7 10E3/UL (ref 1.6–8.3)
NEUTROPHILS NFR BLD AUTO: 66 %
NRBC # BLD AUTO: 0 10E3/UL
NRBC BLD AUTO-RTO: 0 /100
PLATELET # BLD AUTO: 244 10E3/UL (ref 150–450)
POTASSIUM BLD-SCNC: 4.1 MMOL/L (ref 3.4–5.3)
PROT SERPL-MCNC: 7.8 G/DL (ref 6.8–8.8)
RBC # BLD AUTO: 5.48 10E6/UL (ref 4.4–5.9)
SODIUM SERPL-SCNC: 143 MMOL/L (ref 133–144)
WBC # BLD AUTO: 7.3 10E3/UL (ref 4–11)

## 2021-12-14 PROCEDURE — 83036 HEMOGLOBIN GLYCOSYLATED A1C: CPT | Performed by: PATHOLOGY

## 2021-12-14 PROCEDURE — 80061 LIPID PANEL: CPT | Mod: 90 | Performed by: PATHOLOGY

## 2021-12-14 PROCEDURE — 99000 SPECIMEN HANDLING OFFICE-LAB: CPT | Performed by: PATHOLOGY

## 2021-12-14 PROCEDURE — 36415 COLL VENOUS BLD VENIPUNCTURE: CPT | Performed by: PATHOLOGY

## 2021-12-14 PROCEDURE — 80053 COMPREHEN METABOLIC PANEL: CPT | Performed by: PATHOLOGY

## 2021-12-14 PROCEDURE — 85025 COMPLETE CBC W/AUTO DIFF WBC: CPT | Performed by: PATHOLOGY

## 2021-12-15 LAB
CHOLEST SERPL-MCNC: 202 MG/DL
HDLC SERPL-MCNC: 38 MG/DL
LDLC SERPL CALC-MCNC: 122 MG/DL
NONHDLC SERPL-MCNC: 164 MG/DL
TRIGL SERPL-MCNC: 212 MG/DL

## 2022-01-04 ENCOUNTER — OFFICE VISIT (OUTPATIENT)
Dept: ORTHOPEDICS | Facility: CLINIC | Age: 37
End: 2022-01-04
Payer: COMMERCIAL

## 2022-01-04 VITALS
RESPIRATION RATE: 17 BRPM | DIASTOLIC BLOOD PRESSURE: 75 MMHG | HEART RATE: 66 BPM | SYSTOLIC BLOOD PRESSURE: 111 MMHG | HEIGHT: 69 IN | WEIGHT: 181 LBS | BODY MASS INDEX: 26.81 KG/M2

## 2022-01-04 DIAGNOSIS — M25.561 RIGHT KNEE PAIN, UNSPECIFIED CHRONICITY: Primary | ICD-10-CM

## 2022-01-04 PROCEDURE — 99213 OFFICE O/P EST LOW 20 MIN: CPT | Performed by: PREVENTIVE MEDICINE

## 2022-01-04 ASSESSMENT — MIFFLIN-ST. JEOR: SCORE: 1734.77

## 2022-01-04 NOTE — LETTER
1/4/2022      RE: Anthony Kingston  [address]  [place] [address]       HISTORY OF PRESENT ILLNESS  Mr. Kingston is a pleasant 36 year old year old male who presents to clinic today with lumbar pain and knee pain  Doing better  No signficant pain today  Doing voltaren PRN  MEDICAL HISTORY  There is no problem list on file for this patient.      Current Outpatient Medications   Medication Sig Dispense Refill     diclofenac (VOLTAREN) 75 MG EC tablet Take 1 tablet (75 mg) by mouth 2 times daily 60 tablet 0     methylPREDNISolone (MEDROL DOSEPAK) 4 MG tablet therapy pack Follow Package Directions (Patient not taking: Reported on 12/10/2021) 21 tablet 0     methylPREDNISolone (MEDROL DOSEPAK) 4 MG tablet therapy pack Follow Package Directions (Patient not taking: Reported on 12/10/2021) 21 tablet 0     tiZANidine (ZANAFLEX) 4 MG tablet Take 1-2 tablets (4-8 mg) by mouth nightly as needed (Patient not taking: Reported on 12/10/2021) 30 tablet 1     tiZANidine (ZANAFLEX) 4 MG tablet Take 1-2 tablets (4-8 mg) by mouth nightly as needed (Patient not taking: Reported on 12/10/2021) 30 tablet 1       Allergies   Allergen Reactions     Unknown [No Clinical Screening - See Comments]      States he has contact dermatitis        No family history on file.  Social History     Socioeconomic History     Marital status:      Spouse name: None     Number of children: None     Years of education: None     Highest education level: None   Occupational History     None   Tobacco Use     Smoking status: Light Tobacco Smoker     Smokeless tobacco: Never Used     Tobacco comment: E-Cigs on occasion   Substance and Sexual Activity     Alcohol use: None     Drug use: None     Sexual activity: None   Other Topics Concern     Parent/sibling w/ CABG, MI or angioplasty before 65F 55M? Not Asked   Social History Narrative     None     Social Determinants of Health     Financial Resource Strain: Not on file   Food  "Insecurity: Not on file   Transportation Needs: Not on file   Physical Activity: Not on file   Stress: Not on file   Social Connections: Not on file   Intimate Partner Violence: Not on file   Housing Stability: Not on file       Additional medical/Social/Surgical histories reviewed in [facility] and updated as appropriate.     REVIEW OF SYSTEMS (1/4/2022)  10 point ROS of systems including Constitutional, Eyes, Respiratory, Cardiovascular, Gastroenterology, Genitourinary, Integumentary, Musculoskeletal, Psychiatric, Allergic/Immunologic were all negative except for pertinent positives noted in my HPI.     PHYSICAL EXAM  Vitals:    01/04/22 1613   Resp: 17   Weight: 82.1 kg (181 lb)   Height: 1.75 m (5' 8.9\")     Vital Signs: Resp 17   Ht 1.75 m (5' 8.9\")   Wt 82.1 kg (181 lb)   BMI 26.81 kg/m   Patient declined being weighed. Body mass index is 26.81 kg/m .    General  - normal appearance, in no obvious distress  HEENT  - conjunctivae not injected, moist mucous membranes, normocephalic/atraumatic head, ears normal appearance, no lesions, mouth normal appearance, no scars, normal dentition and teeth present  CV  - normal popliteal pulse  Pulm  - normal respiratory pattern, non-labored  Musculoskeletal - right knee  - stance: normal gait without limp, no obvious leg length discrepancy, single-leg squat exhibits knee valgus, internal rotation of the hip, contralateral hip drop  - inspection: no swelling or effusion, normal muscle tone, normal bone and joint alignment, no obvious deformity  - palpation: no joint line tenderness, patellar tendon non-tender, tender medial patellar facet  - ROM: 135 degrees flexion, -5 degrees extension, not painful, crepitus with weight-bearing flexion  - strength: 5/5 in flexion, 5/5 in extension  - neuro: no sensory or motor deficit  - special tests:  (-) Lachman  (-) anterior drawer  (-) Amarilis  (-) Thessaly  (-) varus at 0 and 30 degrees flexion  (-) valgus at 0 and 30 degrees " flexion  (+) Carmine s compression test  (+) patellar grind  (-) patellar apprehension  Neuro  - no sensory or motor deficit, grossly normal coordination, normal muscle tone  Skin  - no ecchymosis, erythema, warmth, or induration, no obvious rash  Psych  - interactive, appropriate, normal mood and affect  ASSESSMENT & PLAN  37 yo male with knee pain due to patellofemoral pain, lumbar ddd, radicular pain    I independently reviewed the following imaging studies:  Lumbar xrays: shows no signficiant ddd  xrays knee: shows some PF narrowing  Can consider PT and further treatments  Cont. voltaren       Appropriate PPE was utilized for prevention of spread of Covid-19.  Juan Bautista MD, CAM        Juan Bautista MD

## 2022-01-04 NOTE — NURSING NOTE
"Reason For Visit:   Chief Complaint   Patient presents with     RECHECK     f'u on Rx and PT. pain in Right Knee. pt stated it's helping, pain is still there but it's better.        Resp 17   Ht 1.75 m (5' 8.9\")   Wt 82.1 kg (181 lb)   BMI 26.81 kg/m      Pain Assessment  Patient Currently in Pain: Yes  0-10 Pain Scale: 1  Primary Pain Location: Knee    Johanny Chapman ATC       "

## 2022-01-04 NOTE — PROGRESS NOTES
HISTORY OF PRESENT ILLNESS  Mr. Kingston is a pleasant 36 year old year old male who presents to clinic today with lumbar pain and knee pain  Doing better  No signficant pain today  Doing voltaren PRN  MEDICAL HISTORY  There is no problem list on file for this patient.      Current Outpatient Medications   Medication Sig Dispense Refill     diclofenac (VOLTAREN) 75 MG EC tablet Take 1 tablet (75 mg) by mouth 2 times daily 60 tablet 0     methylPREDNISolone (MEDROL DOSEPAK) 4 MG tablet therapy pack Follow Package Directions (Patient not taking: Reported on 12/10/2021) 21 tablet 0     methylPREDNISolone (MEDROL DOSEPAK) 4 MG tablet therapy pack Follow Package Directions (Patient not taking: Reported on 12/10/2021) 21 tablet 0     tiZANidine (ZANAFLEX) 4 MG tablet Take 1-2 tablets (4-8 mg) by mouth nightly as needed (Patient not taking: Reported on 12/10/2021) 30 tablet 1     tiZANidine (ZANAFLEX) 4 MG tablet Take 1-2 tablets (4-8 mg) by mouth nightly as needed (Patient not taking: Reported on 12/10/2021) 30 tablet 1       Allergies   Allergen Reactions     Unknown [No Clinical Screening - See Comments]      States he has contact dermatitis        No family history on file.  Social History     Socioeconomic History     Marital status:      Spouse name: None     Number of children: None     Years of education: None     Highest education level: None   Occupational History     None   Tobacco Use     Smoking status: Light Tobacco Smoker     Smokeless tobacco: Never Used     Tobacco comment: E-Cigs on occasion   Substance and Sexual Activity     Alcohol use: None     Drug use: None     Sexual activity: None   Other Topics Concern     Parent/sibling w/ CABG, MI or angioplasty before 65F 55M? Not Asked   Social History Narrative     None     Social Determinants of Health     Financial Resource Strain: Not on file   Food Insecurity: Not on file   Transportation Needs: Not on file   Physical Activity: Not on file  "  Stress: Not on file   Social Connections: Not on file   Intimate Partner Violence: Not on file   Housing Stability: Not on file       Additional medical/Social/Surgical histories reviewed in [facility] and updated as appropriate.     REVIEW OF SYSTEMS (1/4/2022)  10 point ROS of systems including Constitutional, Eyes, Respiratory, Cardiovascular, Gastroenterology, Genitourinary, Integumentary, Musculoskeletal, Psychiatric, Allergic/Immunologic were all negative except for pertinent positives noted in my HPI.     PHYSICAL EXAM  Vitals:    01/04/22 1613   Resp: 17   Weight: 82.1 kg (181 lb)   Height: 1.75 m (5' 8.9\")     Vital Signs: Resp 17   Ht 1.75 m (5' 8.9\")   Wt 82.1 kg (181 lb)   BMI 26.81 kg/m   Patient declined being weighed. Body mass index is 26.81 kg/m .    General  - normal appearance, in no obvious distress  HEENT  - conjunctivae not injected, moist mucous membranes, normocephalic/atraumatic head, ears normal appearance, no lesions, mouth normal appearance, no scars, normal dentition and teeth present  CV  - normal popliteal pulse  Pulm  - normal respiratory pattern, non-labored  Musculoskeletal - right knee  - stance: normal gait without limp, no obvious leg length discrepancy, single-leg squat exhibits knee valgus, internal rotation of the hip, contralateral hip drop  - inspection: no swelling or effusion, normal muscle tone, normal bone and joint alignment, no obvious deformity  - palpation: no joint line tenderness, patellar tendon non-tender, tender medial patellar facet  - ROM: 135 degrees flexion, -5 degrees extension, not painful, crepitus with weight-bearing flexion  - strength: 5/5 in flexion, 5/5 in extension  - neuro: no sensory or motor deficit  - special tests:  (-) Lachman  (-) anterior drawer  (-) Amarilis  (-) Thessaly  (-) varus at 0 and 30 degrees flexion  (-) valgus at 0 and 30 degrees flexion  (+) Carmine s compression test  (+) patellar grind  (-) patellar apprehension  Neuro  - no " sensory or motor deficit, grossly normal coordination, normal muscle tone  Skin  - no ecchymosis, erythema, warmth, or induration, no obvious rash  Psych  - interactive, appropriate, normal mood and affect  ASSESSMENT & PLAN  37 yo male with knee pain due to patellofemoral pain, lumbar ddd, radicular pain    I independently reviewed the following imaging studies:  Lumbar xrays: shows no signficiant ddd  xrays knee: shows some PF narrowing  Can consider PT and further treatments  Cont. voltaren       Appropriate PPE was utilized for prevention of spread of Covid-19.  Juan Bautista MD, CAQSM

## 2022-01-04 NOTE — LETTER
Date:January 19, 2022      Patient was self referred, no letter generated. Do not send.        [facility] Health Information

## 2022-02-04 ENCOUNTER — OFFICE VISIT (OUTPATIENT)
Dept: ORTHOPEDICS | Facility: CLINIC | Age: 37
End: 2022-02-04
Payer: COMMERCIAL

## 2022-02-04 VITALS — BODY MASS INDEX: 26.66 KG/M2 | RESPIRATION RATE: 17 BRPM | WEIGHT: 180 LBS | HEIGHT: 69 IN

## 2022-02-04 DIAGNOSIS — M23.300 DEGENERATIVE TEAR OF LATERAL MENISCUS, RIGHT: ICD-10-CM

## 2022-02-04 DIAGNOSIS — S89.91XS RIGHT KNEE INJURY, SEQUELA: Primary | ICD-10-CM

## 2022-02-04 DIAGNOSIS — M71.20 SYNOVIAL CYST OF POPLITEAL SPACE, UNSPECIFIED LATERALITY: ICD-10-CM

## 2022-02-04 DIAGNOSIS — M25.461 EFFUSION, RIGHT KNEE: ICD-10-CM

## 2022-02-04 DIAGNOSIS — M25.562 KNEE PAIN, LEFT: Primary | ICD-10-CM

## 2022-02-04 PROCEDURE — 20610 DRAIN/INJ JOINT/BURSA W/O US: CPT | Mod: RT | Performed by: PREVENTIVE MEDICINE

## 2022-02-04 PROCEDURE — 99214 OFFICE O/P EST MOD 30 MIN: CPT | Mod: 25 | Performed by: PREVENTIVE MEDICINE

## 2022-02-04 RX ADMIN — TRIAMCINOLONE ACETONIDE 40 MG: 40 INJECTION, SUSPENSION INTRA-ARTICULAR; INTRAMUSCULAR at 13:47

## 2022-02-04 RX ADMIN — LIDOCAINE HYDROCHLORIDE 4 ML: 10 INJECTION, SOLUTION EPIDURAL; INFILTRATION; INTRACAUDAL; PERINEURAL at 13:47

## 2022-02-04 ASSESSMENT — MIFFLIN-ST. JEOR: SCORE: 1730.23

## 2022-02-04 NOTE — LETTER
Date:February 14, 2022      Patient was self referred, no letter generated. Do not send.        [facility] Health Information

## 2022-02-04 NOTE — LETTER
2/4/2022      RE: Anthony Kingston  [address]  [place] [address]       HISTORY OF PRESENT ILLNESS  Mr. Kingston is a pleasant 36 year old year old male who presents to clinic today with   Can explains that   Location:   Quality:  achy pain        Duration:   Timing: occurs intermittently  Context: occurs while exercising and lifting  Modifying factors:  resting and non-use makes it better, movement and use makes it worse  Associated signs & symptoms: none  Previous similar pain: yes  Exercise: lifting weights and cardiovascular on machine  Additional history: as documented    MEDICAL HISTORY  There is no problem list on file for this patient.      Current Outpatient Medications   Medication Sig Dispense Refill     diclofenac (VOLTAREN) 75 MG EC tablet Take 1 tablet (75 mg) by mouth 2 times daily 60 tablet 0     methylPREDNISolone (MEDROL DOSEPAK) 4 MG tablet therapy pack Follow Package Directions 21 tablet 0     methylPREDNISolone (MEDROL DOSEPAK) 4 MG tablet therapy pack Follow Package Directions 21 tablet 0     tiZANidine (ZANAFLEX) 4 MG tablet Take 1-2 tablets (4-8 mg) by mouth nightly as needed 30 tablet 1     tiZANidine (ZANAFLEX) 4 MG tablet Take 1-2 tablets (4-8 mg) by mouth nightly as needed 30 tablet 1       Allergies   Allergen Reactions     Unknown [No Clinical Screening - See Comments]      States he has contact dermatitis        No family history on file.  Social History     Socioeconomic History     Marital status:      Spouse name: None     Number of children: None     Years of education: None     Highest education level: None   Occupational History     None   Tobacco Use     Smoking status: Light Tobacco Smoker     Smokeless tobacco: Never Used     Tobacco comment: E-Cigs on occasion   Substance and Sexual Activity     Alcohol use: None     Drug use: None     Sexual activity: None   Other Topics Concern     Parent/sibling w/ CABG, MI or angioplasty before 65F 55M? Not Asked  "  Social History Narrative     None     Social Determinants of Health     Financial Resource Strain: Not on file   Food Insecurity: Not on file   Transportation Needs: Not on file   Physical Activity: Not on file   Stress: Not on file   Social Connections: Not on file   Intimate Partner Violence: Not on file   Housing Stability: Not on file       Additional medical/Social/Surgical histories reviewed in [facility] and updated as appropriate.     REVIEW OF SYSTEMS (2/4/2022)  10 point ROS of systems including Constitutional, Eyes, Respiratory, Cardiovascular, Gastroenterology, Genitourinary, Integumentary, Musculoskeletal, Psychiatric, Allergic/Immunologic were all negative except for pertinent positives noted in my HPI.     PHYSICAL EXAM  Vitals:    02/04/22 1236   Resp: 17   Weight: 81.6 kg (180 lb)   Height: 1.75 m (5' 8.9\")     Vital Signs: Resp 17   Ht 1.75 m (5' 8.9\")   Wt 81.6 kg (180 lb)   BMI 26.66 kg/m   Patient declined being weighed. Body mass index is 26.66 kg/m .    General  - normal appearance, in no obvious distress  HEENT  - conjunctivae not injected, moist mucous membranes, normocephalic/atraumatic head, ears normal appearance, no lesions, mouth normal appearance, no scars, normal dentition and teeth present  CV  - normal popliteal pulse  Pulm  - normal respiratory pattern, non-labored  Musculoskeletal - knee  - stance: normal gait without limp, no obvious leg length discrepancy  - inspection: trace effusion, no ecchymosis, normal muscle tone, normal bone and joint alignment, no obvious deformity  - palpation: medial joint line tenderness, patella and patellar tendon non-tender, normal popliteal pulse  - ROM: 135 degrees flexion, -5 degrees extension, pain at terminal extension passively  - strength: 5/5 in flexion, 5/5 in extension  - neuro: no sensory or motor deficit  - special tests:  (-) Lachman  (-) anterior drawer  (-) posterior drawer  (-) pivot shift  (+) Amarilis  (+) Thessaly  (-) varus at 0 " and 30 degrees flexion  (-) valgus at 0 and 30 degrees flexion  (-) Carmine s compression test  (-) patellar apprehension  Neuro  - no sensory or motor deficit, grossly normal coordination, normal muscle tone  Skin  - no ecchymosis, erythema, warmth, or induration, no obvious rash  Psych  - interactive, appropriate, normal mood and affect    ASSESSMENT & PLAN  35 yo male with right knee pain and swelling from injury     I independently reviewed the following imaging studies:    Appropriate PPE was utilized for prevention of spread of Covid-19.  Juan Bautista MD, CAQSM      Large Joint Injection/Arthocentesis: R knee joint    Date/Time: 2022 1:47 PM  Performed by: Juan Bautista MD  Authorized by: Juan Bautista MD     Indications:  Pain and osteoarthritis  Needle Size:  22 G  Guidance: landmark guided    Approach:  Superolateral  Location:  Knee      Medications:  40 mg triamcinolone 40 MG/ML; 4 mL lidocaine (PF) 1 %  Outcome:  Tolerated well, no immediate complications  Procedure discussed: discussed risks, benefits, and alternatives    Consent Given by:  Patient  Timeout: timeout called immediately prior to procedure    Prep: patient was prepped and draped in usual sterile fashion          [facility] SP[facility] MEDICINE [address] [address] [phone]  Dept: [phone]  ______________________________________________________________________________    Patient: Anthony Kingston   : 1985   MRN: [id number]   2022    INVASIVE PROCEDURE SAFETY CHECKLIST    Date: 2022   Procedure: Right knee steroid injection   Patient Name: Anthony Kingston  MRN: [id number]  YOB: 1985    Action: Complete sections as appropriate. Any discrepancy results in a HARD COPY until resolved.     PRE PROCEDURE:  Patient ID verified with 2 identifiers (name and  or MRN): Yes  Procedure and site verified with patient/designee (when  able): Yes  Accurate consent documentation in medical record: Yes  H&P (or appropriate assessment) documented in medical record: Yes  H&P must be up to 20 days prior to procedure and updates within 24 hours of procedure as applicable: Yes  Relevant diagnostic and radiology test results appropriately labeled and displayed as applicable: Yes  Procedure site(s) marked with provider initials: Yes    TIMEOUT:  Time-Out performed immediately prior to starting procedure, including verbal and active participation of all team members addressing the following:Yes  * Correct patient identify  * Confirmed that the correct side and site are marked  * An accurate procedure consent form  * Agreement on the procedure to be done  * Correct patient position  * Relevant images and results are properly labeled and appropriately displayed  * The need to administer antibiotics or fluids for irrigation purposes during the procedure as applicable   * Safety precautions based on patient history or medication use    DURING PROCEDURE: Verification of correct person, site, and procedures any time the responsibility for care of the patient is transferred to another member of the care team.       The following medications were given:         Prior to injection, verified patient identity using patient's name and date of birth.  Due to injection administration, patient instructed to remain in clinic for 15 minutes  afterwards, and to report any adverse reaction to me immediately.    Joint injection was performed.    Medication Name: Lidocaine 1%   NDC 98750-581-28  Drug Amount Wasted:  Yes: 12 mg/ml   Vial/Syringe: Multi dose vial  Expiration Date:  07/2025    Medication Name: Kenalog 40mg/mL   NDC [address]-3  Drug Amount Wasted:  None.  Vial/Syringe: Single dose vial  Expiration Date:  10/2023    Scribed by Johanny Chapman ATC for Dr. Bautista on February 4, 2022 at 1:15pm based on the provider's statements to me.     Johanny Chapman ATC              Juan Bautista MD

## 2022-02-04 NOTE — PROGRESS NOTES
Large Joint Injection/Arthocentesis: R knee joint    Date/Time: 2022 1:47 PM  Performed by: Juan Bautista MD  Authorized by: Juan Bautista MD     Indications:  Pain, osteoarthritis, diagnostic evaluation and joint swelling  Needle Size:  22 G  Guidance: landmark guided    Approach:  Posterior  Location:  Knee      Medications:  40 mg triamcinolone 40 MG/ML; 4 mL lidocaine (PF) 1 %  Outcome:  Tolerated well, no immediate complications  Procedure discussed: discussed risks, benefits, and alternatives    Consent Given by:  Patient  Timeout: timeout called immediately prior to procedure    Prep: patient was prepped and draped in usual sterile fashion          [facility] SP[facility]ICINE [address]  [address] [facility] [id number] [phone]  Dept: [phone]  ______________________________________________________________________________    Patient: Anthony Kingston   : 1985   MRN: [id number]   2022    INVASIVE PROCEDURE SAFETY CHECKLIST    Date: 2022   Procedure: Right knee steroid injection   Patient Name: Anthony Kingston  MRN: [id number]  YOB: 1985    Action: Complete sections as appropriate. Any discrepancy results in a HARD COPY until resolved.     PRE PROCEDURE:  Patient ID verified with 2 identifiers (name and  or MRN): Yes  Procedure and site verified with patient/designee (when able): Yes  Accurate consent documentation in medical record: Yes  H&P (or appropriate assessment) documented in medical record: Yes  H&P must be up to 20 days prior to procedure and updates within 24 hours of procedure as applicable: Yes  Relevant diagnostic and radiology test results appropriately labeled and displayed as applicable: Yes  Procedure site(s) marked with provider initials: Yes    TIMEOUT:  Time-Out performed immediately prior to starting procedure, including verbal and active participation of all team members addressing  the following:Yes  * Correct patient identify  * Confirmed that the correct side and site are marked  * An accurate procedure consent form  * Agreement on the procedure to be done  * Correct patient position  * Relevant images and results are properly labeled and appropriately displayed  * The need to administer antibiotics or fluids for irrigation purposes during the procedure as applicable   * Safety precautions based on patient history or medication use    DURING PROCEDURE: Verification of correct person, site, and procedures any time the responsibility for care of the patient is transferred to another member of the care team.       The following medications were given:         Prior to injection, verified patient identity using patient's name and date of birth.  Due to injection administration, patient instructed to remain in clinic for 15 minutes  afterwards, and to report any adverse reaction to me immediately.    Joint injection was performed.    Medication Name: Lidocaine 1%   NDC 23704-543-91  Drug Amount Wasted:  Yes: 12 mg/ml   Vial/Syringe: Multi dose vial  Expiration Date:  07/2025    Medication Name: Kenalog 40mg/mL   NDC [address]-7  Drug Amount Wasted:  None.  Vial/Syringe: Single dose vial  Expiration Date:  10/2023    Scribed by Johanny Chapman ATC for Dr. Bautista on February 4, 2022 at 1:15pm based on the provider's statements to me.     Johanny Chapman ATC

## 2022-02-04 NOTE — NURSING NOTE
"Reason For Visit:   Chief Complaint   Patient presents with     RECHECK     right knee pain       Resp 17   Ht 1.75 m (5' 8.9\")   Wt 81.6 kg (180 lb)   BMI 26.66 kg/m      Pain Assessment  Patient Currently in Pain: Yes  0-10 Pain Scale: 4  Primary Pain Location: Knee  Pain Descriptors: Lorenzo Chapman ATC       "

## 2022-02-04 NOTE — PROGRESS NOTES
HISTORY OF PRESENT ILLNESS  Mr. Kingston is a pleasant 36 year old year old male who presents to clinic today with right knee pain and swelling since injury while skiiing last week  Has had swelling and baker s cyst formation since the  twisting injury    Can explains that he would like to aspirate the cyst if appropriate  Location: right knee  Quality:  achy pain        Duration: 1 week  Timing: occurs intermittently  Context: occurs while exercising and lifting  Modifying factors:  resting and non-use makes it better, movement and use makes it worse  Associated signs & symptoms: swelling in knee/popliteal space  Previous similar pain: no  Exercise: lifting weights and cardiovascular on machine  Additional history: as documented    MEDICAL HISTORY  There is no problem list on file for this patient.      Current Outpatient Medications   Medication Sig Dispense Refill     diclofenac (VOLTAREN) 75 MG EC tablet Take 1 tablet (75 mg) by mouth 2 times daily 60 tablet 0     methylPREDNISolone (MEDROL DOSEPAK) 4 MG tablet therapy pack Follow Package Directions 21 tablet 0     methylPREDNISolone (MEDROL DOSEPAK) 4 MG tablet therapy pack Follow Package Directions 21 tablet 0     tiZANidine (ZANAFLEX) 4 MG tablet Take 1-2 tablets (4-8 mg) by mouth nightly as needed 30 tablet 1     tiZANidine (ZANAFLEX) 4 MG tablet Take 1-2 tablets (4-8 mg) by mouth nightly as needed 30 tablet 1       Allergies   Allergen Reactions     Unknown [No Clinical Screening - See Comments]      States he has contact dermatitis        No family history on file.  Social History     Socioeconomic History     Marital status:      Spouse name: None     Number of children: None     Years of education: None     Highest education level: None   Occupational History     None   Tobacco Use     Smoking status: Light Tobacco Smoker     Smokeless tobacco: Never Used     Tobacco comment: E-Cigs on occasion   Substance and Sexual Activity     Alcohol use: None  "    Drug use: None     Sexual activity: None   Other Topics Concern     Parent/sibling w/ CABG, MI or angioplasty before 65F 55M? Not Asked   Social History Narrative     None     Social Determinants of Health     Financial Resource Strain: Not on file   Food Insecurity: Not on file   Transportation Needs: Not on file   Physical Activity: Not on file   Stress: Not on file   Social Connections: Not on file   Intimate Partner Violence: Not on file   Housing Stability: Not on file       Additional medical/Social/Surgical histories [facility] and updated as appropriate.     REVIEW OF SYSTEMS (2/4/2022)  10 point ROS of systems including Constitutional, Eyes, Respiratory, Cardiovascular, Gastroenterology, Genitourinary, Integumentary, Musculoskeletal, Psychiatric, Allergic/Immunologic were all negative except for pertinent positives noted in my HPI.     PHYSICAL EXAM  Vitals:    02/04/22 1236   Resp: 17   Weight: 81.6 kg (180 lb)   Height: 1.75 m (5' 8.9\")     Vital Signs: Resp 17   Ht 1.75 m (5' 8.9\")   Wt 81.6 kg (180 lb)   BMI 26.66 kg/m   Patient declined being weighed. Body mass index is 26.66 kg/m .    General  - normal appearance, in no obvious distress  HEENT  - conjunctivae not injected, moist mucous membranes, normocephalic/atraumatic head, ears normal appearance, no lesions, mouth normal appearance, no scars, normal dentition and teeth present  CV  - normal popliteal pulse  Pulm  - normal respiratory pattern, non-labored  Musculoskeletal - right knee  - stance: normal gait without limp, no obvious leg length discrepancy  - inspection: mild effusion, no ecchymosis, normal muscle tone, normal bone and joint alignment, no obvious deformity, except palpable baker s cyst  - palpation: medial joint line tenderness, patella and patellar tendon non-tender, normal popliteal pulse  - ROM: 135 degrees flexion, -5 degrees extension, pain at terminal extension passively and flexion  - strength: 5/5 in flexion, 5/5 " in extension  - neuro: no sensory or motor deficit  - special tests:  (-) Lachman  (-) anterior drawer  (-) posterior drawer  (-) pivot shift  (+) Amarilis  (+) Thessaly  (-) varus at 0 and 30 degrees flexion  (-) valgus at 0 and 30 degrees flexion  (-) Carmine s compression test  (-) patellar apprehension  Neuro  - no sensory or motor deficit, grossly normal coordination, normal muscle tone  Skin  - no ecchymosis, erythema, warmth, or induration, no obvious rash  Psych  - interactive, appropriate, normal mood and affect    ASSESSMENT & PLAN  37 yo male with right knee pain and swelling from injury   To meniscus vs. Ligamentous and baker s cyst  I independently reviewed the following imaging studies:  Xray knee: shows some arthritis changes  Discussed and ordered MRI for further workup and evaluation for ligament injury as the aspirate was blood tinged    After a 20 minute discussion and examination, we decided to perform a same day injection for diagnostic and therapeutic purposes for right knee effusion and baker s cyst      PROCEDURE: right knee joint injection and aspiration     The patient was apprised of the risks and the benefits of the procedure and consented and a written consent was signed by the patient.   The patient s knee was sterilely prepped with chloraprep.   40 mg of triamcinolone suspension was drawn up into a 5 mL syringe with 4 mL of 1% lidocaine   The patient was aspirated at baker s cyst and  injected with a 1.5-inch 18-gauge needle at posterior knee baker s cyst  4 ml of blood tinged aspirate from baker s cys  There were no complications. The patient tolerated the procedure well. There was minimal bleeding.   The patient was instructed to ice the knee upon leaving clinic and refrain from overuse over the next 3 days.   The patient was instructed to go to the emergency room with any usual pain, swelling, or redness occurred in the injected area.   The patient was given a followup appointment to  evaluate response to the injection to their increased range of motion and reduction of pain  Appropriate PPE was utilized for prevention of spread of Covid-19.  Juan Bautista MD, CAQSM

## 2022-02-10 ENCOUNTER — ANCILLARY PROCEDURE (OUTPATIENT)
Dept: MRI IMAGING | Facility: CLINIC | Age: 37
End: 2022-02-10
Attending: PREVENTIVE MEDICINE
Payer: COMMERCIAL

## 2022-02-10 DIAGNOSIS — M23.300 DEGENERATIVE TEAR OF LATERAL MENISCUS, RIGHT: ICD-10-CM

## 2022-02-10 DIAGNOSIS — S89.91XS RIGHT KNEE INJURY, SEQUELA: ICD-10-CM

## 2022-02-13 RX ORDER — LIDOCAINE HYDROCHLORIDE 10 MG/ML
4 INJECTION, SOLUTION EPIDURAL; INFILTRATION; INTRACAUDAL; PERINEURAL
Status: SHIPPED | OUTPATIENT
Start: 2022-02-04

## 2022-02-13 RX ORDER — TRIAMCINOLONE ACETONIDE 40 MG/ML
40 INJECTION, SUSPENSION INTRA-ARTICULAR; INTRAMUSCULAR
Status: SHIPPED | OUTPATIENT
Start: 2022-02-04

## 2022-05-16 ENCOUNTER — HEALTH MAINTENANCE LETTER (OUTPATIENT)
Age: 37
End: 2022-05-16

## 2022-09-11 ENCOUNTER — HEALTH MAINTENANCE LETTER (OUTPATIENT)
Age: 37
End: 2022-09-11

## 2023-06-03 ENCOUNTER — HEALTH MAINTENANCE LETTER (OUTPATIENT)
Age: 38
End: 2023-06-03

## 2023-08-11 ENCOUNTER — APPOINTMENT (OUTPATIENT)
Dept: LAB | Facility: CLINIC | Age: 38
End: 2023-08-11
Payer: COMMERCIAL

## 2023-08-11 ENCOUNTER — MYC MEDICAL ADVICE (OUTPATIENT)
Dept: FAMILY MEDICINE | Facility: CLINIC | Age: 38
End: 2023-08-11

## 2023-08-11 ENCOUNTER — VIRTUAL VISIT (OUTPATIENT)
Dept: FAMILY MEDICINE | Facility: CLINIC | Age: 38
End: 2023-08-11
Payer: COMMERCIAL

## 2023-08-11 DIAGNOSIS — J02.9 ACUTE PHARYNGITIS, UNSPECIFIED ETIOLOGY: ICD-10-CM

## 2023-08-11 DIAGNOSIS — J02.9 ACUTE PHARYNGITIS, UNSPECIFIED ETIOLOGY: Primary | ICD-10-CM

## 2023-08-11 LAB
DEPRECATED S PYO AG THROAT QL EIA: NEGATIVE
GROUP A STREP BY PCR: NOT DETECTED

## 2023-08-11 PROCEDURE — 99203 OFFICE O/P NEW LOW 30 MIN: CPT | Mod: VID | Performed by: PHYSICIAN ASSISTANT

## 2023-08-11 PROCEDURE — 87651 STREP A DNA AMP PROBE: CPT | Mod: VID | Performed by: PHYSICIAN ASSISTANT

## 2023-08-11 PROCEDURE — 87635 SARS-COV-2 COVID-19 AMP PRB: CPT | Performed by: PHYSICIAN ASSISTANT

## 2023-08-11 RX ORDER — AMOXICILLIN 500 MG/1
500 CAPSULE ORAL 2 TIMES DAILY
Qty: 10 CAPSULE | Refills: 0 | Status: SHIPPED | OUTPATIENT
Start: 2023-08-11 | End: 2023-08-14

## 2023-08-11 ASSESSMENT — ENCOUNTER SYMPTOMS
ABDOMINAL PAIN: 0
SORE THROAT: 1
SHORTNESS OF BREATH: 0
FATIGUE: 0
HEADACHES: 0
DIZZINESS: 0
WHEEZING: 0
MYALGIAS: 1
CONSTIPATION: 0
FEVER: 0
VOMITING: 0
PALPITATIONS: 0
NAUSEA: 0
HEARTBURN: 0
DIARRHEA: 0
CHILLS: 0
COUGH: 0
LIGHT-HEADEDNESS: 0

## 2023-08-11 NOTE — PROGRESS NOTES
Anthony is a 38 year old who is being evaluated via a billable video visit.      How would you like to obtain your AVS? MyChart  If the video visit is dropped, the invitation should be resent by: Text to cell phone: [phone]  Will anyone else be joining your video visit? No          Assessment & Plan   Acute pharyngitis, unspecified etiology  Fatigue  Malaise  Sore throat, fatigue, muscle ache and decreased appetite the last for 5 days.  No fevers.  No abdominal pains.  No nausea vomiting.  Wife and mother-in-law also have symptoms.  Kids were treated for strep 10 days ago.  Will do strep test and COVID-19 PCR.  We will send amoxicillin 2 times a day for 10 days to the pharmacy.  He is to take this with food.  Side effects of the medication were discussed.  He is to continue with over-the-counter cough and cold medications including Tylenol and ibuprofen for fevers aches and pains.  Push fluids and get plenty of rest.  Follow-up if symptoms worsen or do not improve.  Patient agreed this plan.  Questions were answered  - amoxicillin (AMOXIL) 500 MG capsule; Take 1 capsule (500 mg) by mouth 2 times daily  - Symptomatic COVID-19 Virus (Coronavirus) by PCR  - Streptococcus A Rapid Screen w/Reflex to PCR - Clinic Collect      [facility]   Anthony is a 38 year old, presenting for the following health issues:  Pharyngitis (Children dx w/ strep 10 days and treated with antibiotic. Pt reports sore throat, loss of taste and smell. Fatigue)        8/11/2023     2:33 PM   Additional Questions   Roomed by Yanet Kelley   Accompanied by none       Anthony is a pleasant 38-year-old male that presents via video visit to discuss sore throat, fatigue, muscle aches, and decreased appetite.  Symptoms have been ongoing for the last 4 to 5 days.  His wife and mother-in-law also have similar symptoms.  He states that his kids were diagnosed approximately 10 days ago with group A  "strep and was treated with antibiotics.  Strep is going around in the .  They have been treated with antibiotics and are recovered.  He denies any fevers or chills.  No abdominal pains.  He is a resident working at the [facility] and would like to make sure he is not contagious.    History of Present Illness       Reason for visit:  SORE THROAT AND SUSPICION OF STREP  Symptom onset:  3-7 days ago  Symptoms include:  SORE THROAT FATIGUE LOSS OF SMELL  Symptom intensity:  Mild  Symptom progression:  Worsening  Had these symptoms before:  No  What makes it worse:  COLD  What makes it better:  NO    He eats 2-3 servings of fruits and vegetables daily.He consumes 0 sweetened beverage(s) daily.He exercises with enough effort to increase his heart rate 30 to 60 minutes per day.  He exercises with enough effort to increase his heart rate 4 days per week.   He is taking medications regularly.       Review of Systems   Constitutional:  Negative for chills, fatigue and fever.   HENT:  Positive for congestion and sore throat. Negative for ear discharge and postnasal drip.    Respiratory:  Negative for cough, shortness of breath and wheezing.    Cardiovascular:  Negative for chest pain and palpitations.   Gastrointestinal:  Negative for abdominal pain, constipation, diarrhea, heartburn, nausea and vomiting.   Musculoskeletal:  Positive for myalgias.   Skin:  Negative for rash.   Neurological:  Negative for dizziness, light-headedness and headaches.        Objective    Vitals - Patient Reported  Weight (Patient Reported): 82 kg (180 lb 12.4 oz)  Height (Patient Reported): 175 cm (5' 8.9\")  BMI (Based on Pt Reported Ht/Wt): 26.78  Temperature (Patient Reported): 99.5  F (37.5  C)      Physical Exam   GENERAL: Healthy, alert and no distress  EYES: Eyes grossly normal to inspection.  No discharge or erythema, or obvious scleral/conjunctival abnormalities.  RESP: No audible wheeze, cough, or visible cyanosis.  No " visible retractions or increased work of breathing.    SKIN: Visible skin clear. No significant rash, abnormal pigmentation or lesions.  NEURO: Cranial nerves grossly intact.  Mentation and speech appropriate for age.  PSYCH: Mentation appears normal, affect normal/bright, judgement and insight intact, normal speech and appearance well-groomed.      Video-Visit Details    Type of service:  Video Visit     Originating Location (pt. Location): Home    Distant Location (provider location):  On-site  Platform used for Video Visit: Dimas

## 2023-08-12 LAB — SARS-COV-2 RNA RESP QL NAA+PROBE: NEGATIVE

## 2023-08-14 RX ORDER — AMOXICILLIN 500 MG/1
500 CAPSULE ORAL 2 TIMES DAILY
Qty: 10 CAPSULE | Refills: 0 | Status: SHIPPED | OUTPATIENT
Start: 2023-08-14 | End: 2023-08-14

## 2023-08-14 RX ORDER — AMOXICILLIN 500 MG/1
500 CAPSULE ORAL 2 TIMES DAILY
Qty: 20 CAPSULE | Refills: 0 | Status: SHIPPED | OUTPATIENT
Start: 2023-08-14 | End: 2023-09-07

## 2023-08-15 NOTE — TELEPHONE ENCOUNTER
He did  original prescription. He will  second AMOX rx and take only for an additional 5 days. Not 10days.

## 2023-09-07 ENCOUNTER — OFFICE VISIT (OUTPATIENT)
Dept: FAMILY MEDICINE | Facility: CLINIC | Age: 38
End: 2023-09-07
Payer: COMMERCIAL

## 2023-09-07 VITALS
HEIGHT: 68 IN | RESPIRATION RATE: 12 BRPM | HEART RATE: 88 BPM | TEMPERATURE: 97.8 F | BODY MASS INDEX: 27.37 KG/M2 | DIASTOLIC BLOOD PRESSURE: 62 MMHG | OXYGEN SATURATION: 96 % | SYSTOLIC BLOOD PRESSURE: 104 MMHG

## 2023-09-07 DIAGNOSIS — Z11.59 NEED FOR HEPATITIS C SCREENING TEST: ICD-10-CM

## 2023-09-07 DIAGNOSIS — Z13.220 SCREENING FOR LIPOID DISORDERS: ICD-10-CM

## 2023-09-07 DIAGNOSIS — Z11.59 NEED FOR HEPATITIS B SCREENING TEST: ICD-10-CM

## 2023-09-07 DIAGNOSIS — Z13.1 SCREENING FOR DIABETES MELLITUS: ICD-10-CM

## 2023-09-07 DIAGNOSIS — Z00.00 ROUTINE GENERAL MEDICAL EXAMINATION AT A HEALTH CARE FACILITY: Primary | ICD-10-CM

## 2023-09-07 DIAGNOSIS — Z11.4 SCREENING FOR HIV (HUMAN IMMUNODEFICIENCY VIRUS): ICD-10-CM

## 2023-09-07 PROCEDURE — 90471 IMMUNIZATION ADMIN: CPT | Performed by: FAMILY MEDICINE

## 2023-09-07 PROCEDURE — 90686 IIV4 VACC NO PRSV 0.5 ML IM: CPT | Performed by: FAMILY MEDICINE

## 2023-09-07 PROCEDURE — 99395 PREV VISIT EST AGE 18-39: CPT | Mod: 25 | Performed by: FAMILY MEDICINE

## 2023-09-07 ASSESSMENT — ENCOUNTER SYMPTOMS
NAUSEA: 0
HEMATURIA: 0
MYALGIAS: 0
COUGH: 0
PALPITATIONS: 0
CONSTIPATION: 0
CHILLS: 0
FEVER: 0
HEARTBURN: 0
DYSURIA: 0
DIARRHEA: 0
JOINT SWELLING: 0
HEMATOCHEZIA: 0
SHORTNESS OF BREATH: 0
PARESTHESIAS: 0
FREQUENCY: 0
EYE PAIN: 0
WEAKNESS: 0
NERVOUS/ANXIOUS: 0
SORE THROAT: 0
DIZZINESS: 0
ARTHRALGIAS: 0
ABDOMINAL PAIN: 0
HEADACHES: 0

## 2023-09-07 ASSESSMENT — PAIN SCALES - GENERAL: PAINLEVEL: MODERATE PAIN (4)

## 2023-09-07 NOTE — PROGRESS NOTES
SUBJECTIVE:   CC: Anthony is an 38 year old who presents for preventative health visit.       2023    12:24 PM   Additional Questions   Roomed by Leigh OSMAN CMA       Healthy Habits:     Getting at least 3 servings of Calcium per day:  Yes    Bi-annual eye exam:  Yes    Dental care twice a year:  Yes    Sleep apnea or symptoms of sleep apnea:  None    Diet:  Regular (no restrictions)    Frequency of exercise:  4-5 days/week    Duration of exercise:  30-45 minutes    Taking medications regularly:  Yes    Medication side effects:  Not applicable    Additional concerns today:  Yes                    Social History     Tobacco Use    Smoking status: Former     Types: Cigarettes, Other     Start date: 2003     Quit date: 2022     Years since quittin.0    Smokeless tobacco: Never    Tobacco comments:     E-Cigs on occasion   Substance Use Topics    Alcohol use: Yes     Comment: Once/twice a week             2023     8:26 AM   Alcohol Use   Prescreen: >3 drinks/day or >7 drinks/week? Yes   AUDIT SCORE  13       Reviewed orders with patient. Reviewed health maintenance and updated orders accordingly - Yes      Reviewed and updated as needed this visit by clinical staff   Tobacco  Allergies  Meds              Reviewed and updated as needed this visit by Provider                     Review of Systems   Constitutional:  Negative for chills and fever.   HENT:  Negative for congestion, ear pain, hearing loss and sore throat.    Eyes:  Negative for pain and visual disturbance.   Respiratory:  Negative for cough and shortness of breath.    Cardiovascular:  Negative for chest pain, palpitations and peripheral edema.   Gastrointestinal:  Negative for abdominal pain, constipation, diarrhea, heartburn, hematochezia and nausea.   Genitourinary:  Negative for dysuria, frequency, genital sores, hematuria, impotence, penile discharge and urgency.   Musculoskeletal:  Negative for arthralgias, joint swelling and myalgias.  "  Skin:  Negative for rash.   Neurological:  Negative for dizziness, weakness, headaches and paresthesias.   Psychiatric/Behavioral:  Negative for mood changes. The patient is not nervous/anxious.          OBJECTIVE:   /62 (BP Location: Right arm, Patient Position: Sitting, Cuff Size: Adult Regular)   Pulse 88   Temp 97.8  F (36.6  C) (Oral)   Resp 12   Ht 1.727 m (5' 8\")   SpO2 96%   BMI 27.37 kg/m      Physical Exam  GENERAL: healthy, alert and no distress  EYES: Eyes grossly normal to inspection, PERRL and conjunctivae and sclerae normal  HENT: ear canals and TM's normal, nose and mouth without ulcers or lesions  NECK: no adenopathy, no asymmetry, masses, or scars and thyroid normal to palpation  RESP: lungs clear to auscultation - no rales, rhonchi or wheezes  CV: regular rate and rhythm, normal S1 S2, no S3 or S4, no murmur, click or rub, no peripheral edema and peripheral pulses strong  ABDOMEN: soft, nontender, no hepatosplenomegaly, no masses and bowel sounds normal  MS: no gross musculoskeletal defects noted, no edema  SKIN: no suspicious lesions or rashes  NEURO: Normal strength and tone, mentation intact and speech normal  PSYCH: mentation appears normal, affect normal/bright    Diagnostic Test Results:  Labs reviewed in Epic    ASSESSMENT/PLAN:       ICD-10-CM    1. Routine general medical examination at a health care facility  Z00.00       2. Screening for HIV (human immunodeficiency virus)  Z11.4 HIV Antigen Antibody Combo      3. Need for hepatitis C screening test  Z11.59 Hepatitis C Screen Reflex to HCV RNA Quant and Genotype      4. Screening for lipoid disorders  Z13.220 Glucose      5. Screening for diabetes mellitus  Z13.1 Lipid panel reflex to direct LDL Fasting      6. Need for hepatitis B screening test  Z11.59 Hepatitis B Surface Antibody          Patient has been advised of split billing requirements and indicates understanding: Yes      COUNSELING:   Reviewed preventive health " counseling, as reflected in patient instructions       Regular exercise       Healthy diet/nutrition        He reports that he quit smoking about a year ago. His smoking use included cigarettes and other. He started smoking about 20 years ago. He has never used smokeless tobacco.            MARIBEL THAKKAR MD  [facility]

## 2023-09-12 ENCOUNTER — LAB (OUTPATIENT)
Dept: LAB | Facility: CLINIC | Age: 38
End: 2023-09-12
Payer: COMMERCIAL

## 2023-09-12 DIAGNOSIS — Z13.220 SCREENING FOR LIPOID DISORDERS: ICD-10-CM

## 2023-09-12 DIAGNOSIS — Z11.4 SCREENING FOR HIV (HUMAN IMMUNODEFICIENCY VIRUS): ICD-10-CM

## 2023-09-12 DIAGNOSIS — Z11.59 NEED FOR HEPATITIS B SCREENING TEST: ICD-10-CM

## 2023-09-12 DIAGNOSIS — Z11.59 NEED FOR HEPATITIS C SCREENING TEST: ICD-10-CM

## 2023-09-12 DIAGNOSIS — Z13.1 SCREENING FOR DIABETES MELLITUS: ICD-10-CM

## 2023-09-12 PROCEDURE — 82947 ASSAY GLUCOSE BLOOD QUANT: CPT

## 2023-09-12 PROCEDURE — 87389 HIV-1 AG W/HIV-1&-2 AB AG IA: CPT

## 2023-09-12 PROCEDURE — 80061 LIPID PANEL: CPT

## 2023-09-12 PROCEDURE — 86803 HEPATITIS C AB TEST: CPT

## 2023-09-12 PROCEDURE — 36415 COLL VENOUS BLD VENIPUNCTURE: CPT

## 2023-09-12 PROCEDURE — 86706 HEP B SURFACE ANTIBODY: CPT

## 2023-09-13 LAB
CHOLEST SERPL-MCNC: 224 MG/DL
FASTING STATUS PATIENT QL REPORTED: YES
GLUCOSE SERPL-MCNC: 94 MG/DL (ref 70–99)
HBV SURFACE AB SERPL IA-ACNC: 498.44 M[IU]/ML
HBV SURFACE AB SERPL IA-ACNC: REACTIVE M[IU]/ML
HCV AB SERPL QL IA: NONREACTIVE
HDLC SERPL-MCNC: 38 MG/DL
HIV 1+2 AB+HIV1 P24 AG SERPL QL IA: NONREACTIVE
LDLC SERPL CALC-MCNC: 154 MG/DL
NONHDLC SERPL-MCNC: 186 MG/DL
TRIGL SERPL-MCNC: 158 MG/DL

## 2023-09-26 ENCOUNTER — MYC MEDICAL ADVICE (OUTPATIENT)
Dept: FAMILY MEDICINE | Facility: CLINIC | Age: 38
End: 2023-09-26
Payer: COMMERCIAL

## 2023-09-26 DIAGNOSIS — E78.2 MIXED HYPERLIPIDEMIA: Primary | ICD-10-CM

## 2023-11-03 ENCOUNTER — VIRTUAL VISIT (OUTPATIENT)
Dept: EDUCATION SERVICES | Facility: CLINIC | Age: 38
End: 2023-11-03
Payer: COMMERCIAL

## 2023-11-03 DIAGNOSIS — E78.2 MIXED HYPERLIPIDEMIA: ICD-10-CM

## 2023-11-03 PROCEDURE — 97802 MEDICAL NUTRITION INDIV IN: CPT | Mod: 93

## 2023-11-03 NOTE — PROGRESS NOTES
"Medical Nutrition Therapy  Video/telephone visit  Provider location: off site   Patient location: home   Call time: 1:50p-2:15pm  Visit Type:Initial assessment and intervention  Can Ozutemiz presents today for MNT and education related to hyperlipidemia.   He is accompanied by self.     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  He has worked with a dietitian in the past who was able to provide a specific meal plan for him with measured foods and \"detox\" days.  Reports he does well following a diet.  Gets off track when he drinks alcohol and then finds he snacks on chips and roasted peanuts.  Understands calorie intake and portion control, says he just doesn't practice it all the time.    Wt Readings from Last 5 Encounters:   02/04/22 81.6 kg (180 lb)   01/04/22 82.1 kg (181 lb)   12/10/21 82.5 kg (181 lb 14.1 oz)   09/15/20 86 kg (189 lb 11.2 oz)   09/18/18 79 kg (174 lb 2.6 oz)      NUTRITION HISTORY:  Breakfast: smoothie with 3 Tbs oats, protein powder and 1 serving of fruit OR salad- olives, tomatoes, cheese OR omelet with mozz cheese, olives  Lunch: Spinach, beans or cauliflower with plain yogurt and 2 slices of bread or lefsa OR burger with 1 slice of bread  Dinner: vegetables with fish, chicken or red meat with 4 T yogurt and 2 bread OR veggies with meat  Snacks: apple and almonds OR 1/2 chocolate romulo      Previous diet education:  Yes     Food allergies/intolerances: NKFA        EXERCISE: moderate regular exercise program 3-4 days per week    SOCIO/ECONOMIC:   Lives with: did not address    MEDICATIONS:  No current outpatient medications on file.     Current Facility-Administered Medications   Medication    lidocaine (PF) (XYLOCAINE) 1 % injection 4 mL    triamcinolone (KENALOG-40) injection 40 mg       LABS:  Lab Results   Component Value Date     12/14/2021     03/19/2020      Lab " "Results   Component Value Date    POTASSIUM 4.1 12/14/2021    POTASSIUM 4.3 03/19/2020     Lab Results   Component Value Date    CHLORIDE 109 12/14/2021    CHLORIDE 107 03/19/2020     Lab Results   Component Value Date    JOSE ARMANDO 9.6 12/14/2021    JOSE ARMANDO 9.6 03/19/2020     Lab Results   Component Value Date    CO2 26 12/14/2021    CO2 30 03/19/2020     Lab Results   Component Value Date    BUN 11 12/14/2021    BUN 9 03/19/2020     Lab Results   Component Value Date    CR 0.85 12/14/2021    CR 0.88 03/19/2020     Lab Results   Component Value Date    GLC 94 09/12/2023    GLC 97 12/14/2021    GLC 85 03/19/2020     Lab Results   Component Value Date     09/12/2023     03/19/2020     HDL Cholesterol   Date Value Ref Range Status   03/19/2020 41 >39 mg/dL Final     Direct Measure HDL   Date Value Ref Range Status   09/12/2023 38 (L) >=40 mg/dL Final   ]  GFR Estimate   Date Value Ref Range Status   12/14/2021 >90 >60 mL/min/1.73m2 Final     Comment:     As of July 11, 2021, eGFR is calculated by the CKD-EPI creatinine equation, without race adjustment. eGFR can be influenced by muscle mass, exercise, and diet. The reported eGFR is an estimation only and is only applicable if the renal function is stable.   03/19/2020 >90 >60 mL/min/[1.73_m2] Final     Comment:     Non  GFR Calc  Starting 12/18/2018, serum creatinine based estimated GFR (eGFR) will be   calculated using the Chronic Kidney Disease Epidemiology Collaboration   (CKD-EPI) equation.       Lab Results   Component Value Date    CR 0.85 12/14/2021    CR 0.88 03/19/2020     No results found for: \"MICROALBUMIN\"    ANTHROPOMETRICS:  Vitals: There were no vitals taken for this visit.  There is no height or weight on file to calculate BMI.      Wt Readings from Last 5 Encounters:   02/04/22 81.6 kg (180 lb)   01/04/22 82.1 kg (181 lb)   12/10/21 82.5 kg (181 lb 14.1 oz)   09/15/20 86 kg (189 lb 11.2 oz)   09/18/18 79 kg (174 lb 2.6 oz) "       Weight Change: stable    ESTIMATED KCAL REQUIREMENTS:  0669-7864 kcal per day    NUTRITION DIAGNOSIS: unable to determine    NUTRITION INTERVENTION:  Referral to programs: Health coaching or comprehensive weight management     PATIENT'S BEHAVIOR CHANGE GOALS:   See Patient Instructions for patient stated behavior change goals. AVS was printed and given to patient at today's appointment.    MONITOR / EVALUATE:  RD will monitor/evaluate:  Beliefs and attitudes related to food  Food and nutrition knowledge / skills  Food / Beverage / Nutrient intake   Progress toward meeting stated nutrition-related goals  Readiness to change nutrition-related behaviors  Weight change    FOLLOW-UP:  Follow up with RD as needed.    Cony Fragoso RDN, LD, [facility]  Outpatient Diabetes Education  Adult Diabetes Education Triage [phone]    Time spent in minutes: 25  Encounter: Individual

## 2023-11-03 NOTE — LETTER
"    11/3/2023         RE: Anthony Kingston  [address] [address]        Dear Colleague,    Thank you for referring your patient, Anthony Kingston, to the [facility] [place]. Please see a copy of my visit note below.    Medical Nutrition Therapy  Video/telephone visit  Provider location: off site   Patient location: home   Call time: 1:50p-2:15pm  Visit Type:Initial assessment and intervention  Can Ozutemiz presents today for MNT and education related to hyperlipidemia.   He is accompanied by self.     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  He has worked with a dietitian in the past who was able to provide a specific meal plan for him with measured foods and \"detox\" days.  Reports he does well following a diet.  Gets off track when he drinks alcohol and then finds he snacks on chips and roasted peanuts.  Understands calorie intake and portion control, says he just doesn't practice it all the time.    Wt Readings from Last 5 Encounters:   02/04/22 81.6 kg (180 lb)   01/04/22 82.1 kg (181 lb)   12/10/21 82.5 kg (181 lb 14.1 oz)   09/15/20 86 kg (189 lb 11.2 oz)   09/18/18 79 kg (174 lb 2.6 oz)      NUTRITION HISTORY:  Breakfast: smoothie with 3 Tbs oats, protein powder and 1 serving of fruit OR salad- olives, tomatoes, cheese OR omelet with mozz cheese, olives  Lunch: Spinach, beans or cauliflower with plain yogurt and 2 slices of bread or lefsa OR burger with 1 slice of bread  Dinner: vegetables with fish, chicken or red meat with 4 T yogurt and 2 bread OR veggies with meat  Snacks: apple and almonds OR 1/2 chocolate romulo      Previous diet education:  Yes     Food allergies/intolerances: NKFA        EXERCISE: moderate regular exercise program 3-4 days per week    SOCIO/ECONOMIC:   Lives with: did not address    MEDICATIONS:  No current outpatient medications on file. " "    Current Facility-Administered Medications   Medication     lidocaine (PF) (XYLOCAINE) 1 % injection 4 mL     triamcinolone (KENALOG-40) injection 40 mg       LABS:  Lab Results   Component Value Date     12/14/2021     03/19/2020      Lab Results   Component Value Date    POTASSIUM 4.1 12/14/2021    POTASSIUM 4.3 03/19/2020     Lab Results   Component Value Date    CHLORIDE 109 12/14/2021    CHLORIDE 107 03/19/2020     Lab Results   Component Value Date    JOSE ARMANDO 9.6 12/14/2021    JOSE ARMANDO 9.6 03/19/2020     Lab Results   Component Value Date    CO2 26 12/14/2021    CO2 30 03/19/2020     Lab Results   Component Value Date    BUN 11 12/14/2021    BUN 9 03/19/2020     Lab Results   Component Value Date    CR 0.85 12/14/2021    CR 0.88 03/19/2020     Lab Results   Component Value Date    GLC 94 09/12/2023    GLC 97 12/14/2021    GLC 85 03/19/2020     Lab Results   Component Value Date     09/12/2023     03/19/2020     HDL Cholesterol   Date Value Ref Range Status   03/19/2020 41 >39 mg/dL Final     Direct Measure HDL   Date Value Ref Range Status   09/12/2023 38 (L) >=40 mg/dL Final   ]  GFR Estimate   Date Value Ref Range Status   12/14/2021 >90 >60 mL/min/1.73m2 Final     Comment:     As of July 11, 2021, eGFR is calculated by the CKD-EPI creatinine equation, without race adjustment. eGFR can be influenced by muscle mass, exercise, and diet. The reported eGFR is an estimation only and is only applicable if the renal function is stable.   03/19/2020 >90 >60 mL/min/[1.73_m2] Final     Comment:     Non  GFR Calc  Starting 12/18/2018, serum creatinine based estimated GFR (eGFR) will be   calculated using the Chronic Kidney Disease Epidemiology Collaboration   (CKD-EPI) equation.       Lab Results   Component Value Date    CR 0.85 12/14/2021    CR 0.88 03/19/2020     No results found for: \"MICROALBUMIN\"    ANTHROPOMETRICS:  Vitals: There were no vitals taken for this visit.  There is " no height or weight on file to calculate BMI.      Wt Readings from Last 5 Encounters:   02/04/22 81.6 kg (180 lb)   01/04/22 82.1 kg (181 lb)   12/10/21 82.5 kg (181 lb 14.1 oz)   09/15/20 86 kg (189 lb 11.2 oz)   09/18/18 79 kg (174 lb 2.6 oz)       Weight Change: stable    ESTIMATED KCAL REQUIREMENTS:  2978-2462 kcal per day    NUTRITION DIAGNOSIS: unable to determine    NUTRITION INTERVENTION:  Referral to programs: Health coaching or comprehensive weight management     PATIENT'S BEHAVIOR CHANGE GOALS:   See Patient Instructions for patient stated behavior change goals. AVS was printed and given to patient at today's appointment.    MONITOR / EVALUATE:  RD will monitor/evaluate:  Beliefs and attitudes related to food  Food and nutrition knowledge / skills  Food / Beverage / Nutrient intake   Progress toward meeting stated nutrition-related goals  Readiness to change nutrition-related behaviors  Weight change    FOLLOW-UP:  Follow up with RD as needed.    Cony Fragoso RDN, LD, [facility]  Outpatient Diabetes Education  Adult Diabetes Education Triage [phone]    Time spent in minutes: 25  Encounter: Individual

## 2023-11-03 NOTE — PATIENT INSTRUCTIONS
Hi Can,   After our discussion, I think you would most benefit from the 24-week Healthy Lifestyle program that the [facility] offers.  They have RDs that can help with meal planning along with the coaching support provided.  There is also a health coaching piece as well.  The number is [phone].

## 2024-04-01 ENCOUNTER — TELEPHONE (OUTPATIENT)
Dept: AUDIOLOGY | Facility: CLINIC | Age: 39
End: 2024-04-01
Payer: COMMERCIAL

## 2024-04-01 DIAGNOSIS — Z01.10 ENCOUNTER FOR HEARING TEST: Primary | ICD-10-CM

## 2024-04-03 ENCOUNTER — OFFICE VISIT (OUTPATIENT)
Dept: AUDIOLOGY | Facility: CLINIC | Age: 39
End: 2024-04-03
Payer: COMMERCIAL

## 2024-04-03 DIAGNOSIS — Z01.10 ENCOUNTER FOR EXAMINATION OF EARS AND HEARING WITHOUT ABNORMAL FINDINGS: Primary | ICD-10-CM

## 2024-04-03 PROCEDURE — 92557 COMPREHENSIVE HEARING TEST: CPT | Performed by: AUDIOLOGIST

## 2024-04-03 PROCEDURE — 92550 TYMPANOMETRY & REFLEX THRESH: CPT | Performed by: AUDIOLOGIST

## 2024-04-03 NOTE — PROGRESS NOTES
AUDIOLOGY REPORT    SUMMARY: Audiology visit completed. See audiogram for results.      RECOMMENDATIONS: Follow-up with ENT.    Forest Guzmán, [facility]  Licensed Audiologist  [place] #[id number]

## 2024-11-30 ENCOUNTER — HEALTH MAINTENANCE LETTER (OUTPATIENT)
Age: 39
End: 2024-11-30

## 2024-12-16 DIAGNOSIS — M25.561 RIGHT KNEE PAIN: Primary | ICD-10-CM

## 2024-12-18 NOTE — TELEPHONE ENCOUNTER
DIAGNOSIS: RIGHT KNEE PAIN / LEFT ANKLE   APPOINTMENT DATE: 12/20/2024   NOTES STATUS DETAILS   OFFICE NOTE from referring provider SELF    OFFICE NOTE from other specialist Internal 02/04/2022 - Juan Bautista MD - [facility]   MRI PACS Internal:  02/10/2022 - RT Knee   XRAYS (IMAGES & REPORTS) PACS Internal:  12/10/2021 - RT Knee

## 2024-12-19 DIAGNOSIS — M25.572 LEFT ANKLE PAIN: Primary | ICD-10-CM

## 2024-12-20 ENCOUNTER — ANCILLARY PROCEDURE (OUTPATIENT)
Dept: GENERAL RADIOLOGY | Facility: CLINIC | Age: 39
End: 2024-12-20
Attending: ORTHOPAEDIC SURGERY
Payer: COMMERCIAL

## 2024-12-20 ENCOUNTER — PRE VISIT (OUTPATIENT)
Dept: ORTHOPEDICS | Facility: CLINIC | Age: 39
End: 2024-12-20

## 2024-12-20 DIAGNOSIS — M25.572 LEFT ANKLE PAIN: ICD-10-CM

## 2024-12-20 DIAGNOSIS — M25.561 RIGHT KNEE PAIN: ICD-10-CM

## 2024-12-20 PROCEDURE — 73562 X-RAY EXAM OF KNEE 3: CPT | Mod: RT | Performed by: RADIOLOGY

## 2024-12-20 PROCEDURE — 73610 X-RAY EXAM OF ANKLE: CPT | Mod: LT | Performed by: RADIOLOGY

## 2025-01-09 ENCOUNTER — ANCILLARY PROCEDURE (OUTPATIENT)
Dept: MRI IMAGING | Facility: CLINIC | Age: 40
End: 2025-01-09
Attending: STUDENT IN AN ORGANIZED HEALTH CARE EDUCATION/TRAINING PROGRAM
Payer: COMMERCIAL

## 2025-01-09 DIAGNOSIS — M25.561 CHRONIC PAIN OF RIGHT KNEE: ICD-10-CM

## 2025-01-09 DIAGNOSIS — G89.29 CHRONIC PAIN OF RIGHT KNEE: ICD-10-CM

## 2025-01-13 ENCOUNTER — OFFICE VISIT (OUTPATIENT)
Dept: FAMILY MEDICINE | Facility: CLINIC | Age: 40
End: 2025-01-13
Payer: COMMERCIAL

## 2025-01-13 VITALS
WEIGHT: 179 LBS | OXYGEN SATURATION: 98 % | RESPIRATION RATE: 16 BRPM | SYSTOLIC BLOOD PRESSURE: 100 MMHG | TEMPERATURE: 97.6 F | BODY MASS INDEX: 27.13 KG/M2 | HEIGHT: 68 IN | HEART RATE: 70 BPM | DIASTOLIC BLOOD PRESSURE: 70 MMHG

## 2025-01-13 DIAGNOSIS — F41.1 GAD (GENERALIZED ANXIETY DISORDER): ICD-10-CM

## 2025-01-13 DIAGNOSIS — R00.2 PALPITATIONS: ICD-10-CM

## 2025-01-13 DIAGNOSIS — F32.9 CURRENT EPISODE OF MAJOR DEPRESSIVE DISORDER WITHOUT PRIOR EPISODE, UNSPECIFIED DEPRESSION EPISODE SEVERITY: Primary | ICD-10-CM

## 2025-01-13 PROCEDURE — 99417 PROLNG OP E/M EACH 15 MIN: CPT | Performed by: FAMILY MEDICINE

## 2025-01-13 PROCEDURE — 99215 OFFICE O/P EST HI 40 MIN: CPT | Performed by: FAMILY MEDICINE

## 2025-01-13 PROCEDURE — G2211 COMPLEX E/M VISIT ADD ON: HCPCS | Performed by: FAMILY MEDICINE

## 2025-01-13 ASSESSMENT — ANXIETY QUESTIONNAIRES
7. FEELING AFRAID AS IF SOMETHING AWFUL MIGHT HAPPEN: SEVERAL DAYS
5. BEING SO RESTLESS THAT IT IS HARD TO SIT STILL: NOT AT ALL
GAD7 TOTAL SCORE: 7
GAD7 TOTAL SCORE: 7
7. FEELING AFRAID AS IF SOMETHING AWFUL MIGHT HAPPEN: SEVERAL DAYS
6. BECOMING EASILY ANNOYED OR IRRITABLE: MORE THAN HALF THE DAYS
2. NOT BEING ABLE TO STOP OR CONTROL WORRYING: SEVERAL DAYS
1. FEELING NERVOUS, ANXIOUS, OR ON EDGE: SEVERAL DAYS
IF YOU CHECKED OFF ANY PROBLEMS ON THIS QUESTIONNAIRE, HOW DIFFICULT HAVE THESE PROBLEMS MADE IT FOR YOU TO DO YOUR WORK, TAKE CARE OF THINGS AT HOME, OR GET ALONG WITH OTHER PEOPLE: SOMEWHAT DIFFICULT
4. TROUBLE RELAXING: SEVERAL DAYS
3. WORRYING TOO MUCH ABOUT DIFFERENT THINGS: SEVERAL DAYS
8. IF YOU CHECKED OFF ANY PROBLEMS, HOW DIFFICULT HAVE THESE MADE IT FOR YOU TO DO YOUR WORK, TAKE CARE OF THINGS AT HOME, OR GET ALONG WITH OTHER PEOPLE?: SOMEWHAT DIFFICULT
GAD7 TOTAL SCORE: 7

## 2025-01-13 ASSESSMENT — PATIENT HEALTH QUESTIONNAIRE - PHQ9
SUM OF ALL RESPONSES TO PHQ QUESTIONS 1-9: 15
SUM OF ALL RESPONSES TO PHQ QUESTIONS 1-9: 15
10. IF YOU CHECKED OFF ANY PROBLEMS, HOW DIFFICULT HAVE THESE PROBLEMS MADE IT FOR YOU TO DO YOUR WORK, TAKE CARE OF THINGS AT HOME, OR GET ALONG WITH OTHER PEOPLE: SOMEWHAT DIFFICULT

## 2025-01-13 ASSESSMENT — COLUMBIA-SUICIDE SEVERITY RATING SCALE - C-SSRS
2. IN THE PAST MONTH, HAVE YOU ACTUALLY HAD ANY THOUGHTS OF KILLING YOURSELF?: NO
1. WITHIN THE PAST MONTH, HAVE YOU WISHED YOU WERE DEAD OR WISHED YOU COULD GO TO SLEEP AND NOT WAKE UP?: YES
6. HAVE YOU EVER DONE ANYTHING, STARTED TO DO ANYTHING, OR PREPARED TO DO ANYTHING TO END YOUR LIFE?: NO

## 2025-01-13 NOTE — PROGRESS NOTES
Assessment & Plan     (F32.9) Current episode of major depressive disorder without prior episode, unspecified depression episode severity  (primary encounter diagnosis)  Comment: pt has depression since 12/2024. He state the triggers likely due to stress from work, his house construction to make noise, his wife is radiologist residency and busy, so he needed to take care of his two children, stopped to do exercise etc. He  has no history of mental disorder. He had a vacation several weeks ago that helped, also he started to do exercise and quit drinking alcohol that all helped. Sometimes he has thought of better off dead but never hurt himself and other people, he has no plan and state he knows he would never commit suicide and homicide. Phq score  15.   Plan: Adult Mental Health  Referral        We discussed the options of treatment: medication, mental health therapy etc. Advise to quit drinking alcohol.  He likes to take medication but wants to wait for the psychiatrist to start. He gave me a psychiatrist name that he wants to see and I did referral, but he has no phone number and fax number so I  advises him to call to make an appointment. He declined to see therapist which he thinks will not work.       (F41.1) TERENCE (generalized anxiety disorder)  Comment: pt state he is easy to get irritate and he is stress. Terence 7 score 7.   Plan: Adult Mental Health  Referral        We discuss the treatment options. He likes to have consult with psychiatrist. Advise to quit alcohol.     (R00.2) Palpitations  Comment: pt state he has felt palpitations since 12/2024. After he started exercise the palpitations improved.   He state he was told possible [place]ick sinus [place]. No treatment.   He wants to have cardiologist consult. He talked with Dr. Kirk and wants to see him.   Denies cp, sob, fever.    Plan: Adult Cardiology Eval  Referral, TSH         with  "free T4 reflex, CBC with platelets,         Comprehensive metabolic panel (BMP + Alb, Alk         Phos, ALT, AST, Total. Bili, TP), Lipid panel         reflex to direct LDL Fasting, CANCELED: Lipid         panel reflex to direct LDL Non-fasting        Pt wants to see Dr. Yelena Cruz.  [facility] . He dose not have the phone number and fax number. Advise pt to call to make an appointment.     The longitudinal plan of care for the diagnosis(es)/condition(s) as documented were addressed during this visit. Due to the added complexity in care, I will continue to support Can in the subsequent management and with ongoing continuity of care.      Total time for the visit is 60 minutes including review the chart, face to face encounter and document.   BMI  Estimated body mass index is 27.22 kg/m  as calculated from the following:    Height as of this encounter: 1.727 m (5' 8\").    Weight as of this encounter: 81.2 kg (179 lb).   Weight management plan: Discussed healthy diet and exercise guidelines    Depression Screening Follow Up        1/13/2025     2:11 PM   PHQ   PHQ-9 Total Score 15    Q9: Thoughts of better off dead/self-harm past 2 weeks Several days   F/U: Thoughts of suicide or self-harm Yes   F/U: Self harm-plan No   F/U: Self-harm action Yes   F/U: Safety concerns No       Patient-reported         1/13/2025     2:11 PM   Last PHQ-9   1.  Little interest or pleasure in doing things 3   2.  Feeling down, depressed, or hopeless 3   3.  Trouble falling or staying asleep, or sleeping too much 1   4.  Feeling tired or having little energy 2   5.  Poor appetite or overeating 2   6.  Feeling bad about yourself 2   7.  Trouble concentrating 1   8.  Moving slowly or restless 0   Q9: Thoughts of better off dead/self-harm past 2 weeks 1   PHQ-9 Total Score 15    In the past two weeks have you had thoughts of suicide or self harm? Yes   Do you have concerns about your personal safety or the safety of others? No "   In the past 2 weeks have you thought about a plan or had intention to harm yourself? No   In the past 2 weeks have you acted on these thoughts in any way? Yes       Patient-reported               1/13/2025     4:49 PM   C-SSRS (Brief [place])   Within the last month, have you wished you were dead or wished you could go to sleep and not wake up? Yes   Within the last month, have you had any actual thoughts of killing yourself? No   Within the last month, have you ever done anything, started to do anything, or prepared to do anything to end your life? No             Follow Up Actions Taken  Crisis resource information provided in the After Visit Summary    Discussed the following ways the patient can remain in a safe environment:  remove alcohol, remove drugs, dispose of old medications , and be around others    See Patient Instructions       Reyes Cruz is a 39 year old, presenting for the following health issues:  Heart Problem and Depression        1/13/2025     3:12 PM   Additional Questions   Roomed by Gen NIKOLAS CMA     Heart Problem    History of Present Illness       Mental Health Follow-up:  Patient presents to follow-up on Depression & Anxiety.Patient's depression since last visit has been:  No change  The patient is having other symptoms associated with depression.  Patient's anxiety since last visit has been:  No change  The patient is having other symptoms associated with anxiety.  Any significant life events: job concerns and financial concerns  Patient is feeling anxious or having panic attacks.  Patient has concerns about alcohol or drug use.   He is taking medications regularly.                 Review of Systems  Constitutional, HEENT, cardiovascular, pulmonary, gi and gu systems are negative, except as otherwise noted.      Objective    /70 (BP Location: Right arm, Patient Position: Sitting, Cuff Size: Adult Regular)   Pulse 70   Temp 97.6  F (36.4  C) (Oral)   Resp 16   Ht 1.727 m (5'  "8\")   Wt 81.2 kg (179 lb)   SpO2 98%   BMI 27.22 kg/m    Body mass index is 27.22 kg/m .  Physical Exam   GENERAL: alert and no distress  Mental status exam; he is alert, orient to time, person and place. Normal thought content, speech, affect, mood and dress are noted.        Signed Electronically by: Sara Silver MD    "

## 2025-01-16 ENCOUNTER — MYC MEDICAL ADVICE (OUTPATIENT)
Dept: ORTHOPEDICS | Facility: CLINIC | Age: 40
End: 2025-01-16
Payer: COMMERCIAL

## (undated) RX ORDER — LIDOCAINE HYDROCHLORIDE 10 MG/ML
INJECTION, SOLUTION INFILTRATION; PERINEURAL
Status: DISPENSED
Start: 2022-02-04

## (undated) RX ORDER — TRIAMCINOLONE ACETONIDE 40 MG/ML
INJECTION, SUSPENSION INTRA-ARTICULAR; INTRAMUSCULAR
Status: DISPENSED
Start: 2022-02-04